# Patient Record
Sex: FEMALE | Race: ASIAN | Employment: FULL TIME | URBAN - METROPOLITAN AREA
[De-identification: names, ages, dates, MRNs, and addresses within clinical notes are randomized per-mention and may not be internally consistent; named-entity substitution may affect disease eponyms.]

---

## 2017-11-03 ENCOUNTER — OFFICE VISIT (OUTPATIENT)
Dept: INTERNAL MEDICINE CLINIC | Facility: CLINIC | Age: 27
End: 2017-11-03

## 2017-11-03 ENCOUNTER — TELEPHONE (OUTPATIENT)
Dept: INTERNAL MEDICINE CLINIC | Facility: CLINIC | Age: 27
End: 2017-11-03

## 2017-11-03 VITALS
RESPIRATION RATE: 12 BRPM | HEART RATE: 76 BPM | BODY MASS INDEX: 20.99 KG/M2 | TEMPERATURE: 98 F | SYSTOLIC BLOOD PRESSURE: 104 MMHG | HEIGHT: 58 IN | WEIGHT: 100 LBS | DIASTOLIC BLOOD PRESSURE: 70 MMHG

## 2017-11-03 DIAGNOSIS — Z01.419 WELL FEMALE EXAM WITH ROUTINE GYNECOLOGICAL EXAM: ICD-10-CM

## 2017-11-03 DIAGNOSIS — Z00.00 ANNUAL PHYSICAL EXAM: Primary | ICD-10-CM

## 2017-11-03 DIAGNOSIS — R21 RASH: ICD-10-CM

## 2017-11-03 DIAGNOSIS — F41.1 GAD (GENERALIZED ANXIETY DISORDER): ICD-10-CM

## 2017-11-03 PROCEDURE — 99395 PREV VISIT EST AGE 18-39: CPT | Performed by: INTERNAL MEDICINE

## 2017-11-03 RX ORDER — CLOTRIMAZOLE AND BETAMETHASONE DIPROPIONATE 10; .64 MG/G; MG/G
1 CREAM TOPICAL 2 TIMES DAILY
Qty: 30 G | Refills: 0 | Status: SHIPPED | OUTPATIENT
Start: 2017-11-03 | End: 2020-07-30

## 2017-11-03 RX ORDER — ALPRAZOLAM 0.25 MG/1
0.25 TABLET ORAL NIGHTLY PRN
Qty: 30 TABLET | Refills: 0 | Status: SHIPPED | OUTPATIENT
Start: 2017-11-03 | End: 2018-04-14

## 2017-11-03 NOTE — TELEPHONE ENCOUNTER
Pt contacted (Name and  verified) and Md message below relayed to pt. Pharmacy information obtained from pt and Rx sent as written below.     Pt verbalizes understanding, expresses intent to comply, denies any further questions or concerns and agrees

## 2017-11-03 NOTE — TELEPHONE ENCOUNTER
Pls call pt; I forgot to give her cream for her rash on foot; we can give her generic lotrisone cream applied bid for one week. 30gm   Call back if persist. Stop using neosporin on the rash.

## 2017-11-03 NOTE — PROGRESS NOTES
HPI:    Patient ID: Danisha Officer is a 32year old female. Patient presents today for annual physical.       Anxiety   This is a chronic problem. The current episode started more than 1 year ago. The problem occurs intermittently.  The problem has present. Cardiovascular: Normal rate, regular rhythm and normal heart sounds. No murmur heard. Pulmonary/Chest: Effort normal and breath sounds normal. No respiratory distress. She has no wheezes. She has no rales. Abdominal: Soft.  Bowel sounds are

## 2018-04-14 ENCOUNTER — TELEPHONE (OUTPATIENT)
Dept: INTERNAL MEDICINE CLINIC | Facility: CLINIC | Age: 28
End: 2018-04-14

## 2018-04-16 RX ORDER — ALPRAZOLAM 0.25 MG/1
TABLET ORAL
Qty: 30 TABLET | Refills: 0 | OUTPATIENT
Start: 2018-04-16 | End: 2018-08-19

## 2018-08-20 RX ORDER — ALPRAZOLAM 0.25 MG/1
TABLET ORAL
Qty: 30 TABLET | Refills: 0 | Status: SHIPPED
Start: 2018-08-20 | End: 2019-06-28 | Stop reason: ALTCHOICE

## 2018-08-20 NOTE — TELEPHONE ENCOUNTER
Phone call to [facility], for Alprazolam o.25 mg tab, qty # 30 with no additional refills.

## 2018-08-20 NOTE — TELEPHONE ENCOUNTER
LOV: 11-3-17 Last Rx: 4-16-18     Please advise in regards to refill request. Thank You    Please respond to pool: REKHA DON LPN/JEAN CARLOS

## 2019-06-28 ENCOUNTER — OFFICE VISIT (OUTPATIENT)
Dept: INTERNAL MEDICINE CLINIC | Facility: CLINIC | Age: 29
End: 2019-06-28
Payer: COMMERCIAL

## 2019-06-28 VITALS
TEMPERATURE: 99 F | DIASTOLIC BLOOD PRESSURE: 67 MMHG | SYSTOLIC BLOOD PRESSURE: 103 MMHG | HEART RATE: 60 BPM | HEIGHT: 58 IN | RESPIRATION RATE: 18 BRPM | WEIGHT: 99 LBS | BODY MASS INDEX: 20.78 KG/M2

## 2019-06-28 DIAGNOSIS — Z01.419 WELL FEMALE EXAM WITH ROUTINE GYNECOLOGICAL EXAM: ICD-10-CM

## 2019-06-28 DIAGNOSIS — Z00.00 ANNUAL PHYSICAL EXAM: Primary | ICD-10-CM

## 2019-06-28 PROCEDURE — 99395 PREV VISIT EST AGE 18-39: CPT | Performed by: INTERNAL MEDICINE

## 2019-06-28 NOTE — PROGRESS NOTES
HPI:    Patient ID: Danisha Officer is a 25year old female. Patient presents today for her school physical/annual physical otherwise has  No specific complaint. She is otherwise had been healthy with pmhx signficant for mild hyperlipidemia before. has no wheezes. She has no rales. Abdominal: Soft. Bowel sounds are normal. She exhibits no distension and no mass. There is no tenderness. There is no rebound and no guarding. Musculoskeletal: Normal range of motion.  She exhibits no edema or tendernes

## 2019-06-29 PROBLEM — Z00.00 ANNUAL PHYSICAL EXAM: Status: ACTIVE | Noted: 2019-06-29

## 2019-06-29 PROBLEM — Z01.419 WELL FEMALE EXAM WITH ROUTINE GYNECOLOGICAL EXAM: Status: ACTIVE | Noted: 2019-06-29

## 2019-07-11 ENCOUNTER — TELEPHONE (OUTPATIENT)
Dept: INTERNAL MEDICINE CLINIC | Facility: CLINIC | Age: 29
End: 2019-07-11

## 2019-07-11 DIAGNOSIS — Z00.00 ANNUAL PHYSICAL EXAM: Primary | ICD-10-CM

## 2019-07-11 DIAGNOSIS — Z11.9 SCREENING EXAMINATION FOR INFECTIOUS DISEASE: ICD-10-CM

## 2019-07-11 NOTE — TELEPHONE ENCOUNTER
Fax  Received from Patient  She would like Student Health record form completed and signed by MD. Please call pt when ready for . Dr. Robin Head form placed on your desk for review/ completion LOV 6/28/19.

## 2019-07-16 NOTE — TELEPHONE ENCOUNTER
Called pt she asked if I can faxed orders to her at [phone] she is next to fax now. (ordered 7/16, 7/15,6/28)Orders faxed.

## 2019-07-16 NOTE — TELEPHONE ENCOUNTER
Pt called back informed of Dr. Antwon Lan note below and she voiced understanding. Dr. Antwon Lan patient would like to know if you can add MMR, Varicella & Hep B titers. Stts she needs it for school.  She would like to go to [employer]. Please call after 4pm since she will be at

## 2019-07-23 LAB
ABSOLUTE BASOPHILS: 62 CELLS/UL (ref 0–200)
ABSOLUTE EOSINOPHILS: 157 CELLS/UL (ref 15–500)
ABSOLUTE LYMPHOCYTES: 1422 CELLS/UL (ref 850–3900)
ABSOLUTE MONOCYTES: 605 CELLS/UL (ref 200–950)
ABSOLUTE NEUTROPHILS: 3354 CELLS/UL (ref 1500–7800)
ALBUMIN/GLOBULIN RATIO: 1.3 (CALC) (ref 1–2.5)
ALBUMIN: 4 G/DL (ref 3.6–5.1)
ALKALINE PHOSPHATASE: 42 U/L (ref 33–115)
ALT: 13 U/L (ref 6–29)
APPEARANCE: CLEAR
AST: 17 U/L (ref 10–30)
BASOPHILS: 1.1 %
BILIRUBIN, TOTAL: 0.4 MG/DL (ref 0.2–1.2)
BILIRUBIN: NEGATIVE
BUN: 12 MG/DL (ref 7–25)
CALCIUM: 9.1 MG/DL (ref 8.6–10.2)
CARBON DIOXIDE: 23 MMOL/L (ref 20–32)
CHLORIDE: 108 MMOL/L (ref 98–110)
CHOL/HDLC RATIO: 3.1 (CALC)
CHOLESTEROL, TOTAL: 185 MG/DL
COLOR: YELLOW
CREATININE: 0.66 MG/DL (ref 0.5–1.1)
EGFR IF AFRICN AM: 137 ML/MIN/1.73M2
EGFR IF NONAFRICN AM: 119 ML/MIN/1.73M2
EOSINOPHILS: 2.8 %
GLOBULIN: 3.1 G/DL (CALC) (ref 1.9–3.7)
GLUCOSE: 88 MG/DL (ref 65–99)
GLUCOSE: NEGATIVE
HDL CHOLESTEROL: 60 MG/DL
HEMATOCRIT: 40.6 % (ref 35–45)
HEMOGLOBIN: 13.1 G/DL (ref 11.7–15.5)
HEPATITIS B SURFACE$ANTIBODY QL: REACTIVE
LDL-CHOLESTEROL: 110 MG/DL (CALC)
LEUKOCYTE ESTERASE: NEGATIVE
LYMPHOCYTES: 25.4 %
MCH: 28.1 PG (ref 27–33)
MCHC: 32.3 G/DL (ref 32–36)
MCV: 86.9 FL (ref 80–100)
MEASLES ANTIBODY (IGG): 114 AU/ML
MONOCYTES: 10.8 %
MPV: 9.7 FL (ref 7.5–12.5)
MUMPS VIRUS$ANTIBODY (IGG): 62.5 AU/ML
NEUTROPHILS: 59.9 %
NITRITE: NEGATIVE
NON-HDL CHOLESTEROL: 125 MG/DL (CALC)
OCCULT BLOOD: NEGATIVE
PH: 6 (ref 5–8)
PLATELET COUNT: 367 THOUSAND/UL (ref 140–400)
POTASSIUM: 4.3 MMOL/L (ref 3.5–5.3)
PROTEIN, TOTAL: 7.1 G/DL (ref 6.1–8.1)
PROTEIN: NEGATIVE
RDW: 12.7 % (ref 11–15)
RED BLOOD CELL COUNT: 4.67 MILLION/UL (ref 3.8–5.1)
RUBELLA ANTIBODY (IGG): 2.66 INDEX
SODIUM: 139 MMOL/L (ref 135–146)
SPECIFIC GRAVITY: 1.03 (ref 1–1.03)
TRIGLYCERIDES: 64 MG/DL
VARICELLA ZOSTER VIRUS$AB (IGG): 783.6 INDEX
WHITE BLOOD CELL COUNT: 5.6 THOUSAND/UL (ref 3.8–10.8)

## 2019-07-27 ENCOUNTER — TELEPHONE (OUTPATIENT)
Dept: INTERNAL MEDICINE CLINIC | Facility: CLINIC | Age: 29
End: 2019-07-27

## 2019-07-27 NOTE — TELEPHONE ENCOUNTER
LMTCB. Office number given with instructions to call back Monday after 8 am.Forms at [employer] work station.

## 2019-07-30 ENCOUNTER — TELEPHONE (OUTPATIENT)
Dept: INTERNAL MEDICINE CLINIC | Facility: CLINIC | Age: 29
End: 2019-07-30

## 2019-07-30 NOTE — TELEPHONE ENCOUNTER
Pt is requesting a Vaccination shot will like to scheduled for this Friday if possible so she can  the forms at the same time because she live a hour away     Please advise

## 2019-07-31 NOTE — TELEPHONE ENCOUNTER
Pt advised per lab results she is immune and does not need vaccines. Pt is asking if labs, and PE form can be faxed to her. Informed pt PE form not scanned into chart and will reach out to clinic site to request they fax the form. Pt will call back with fax number. Clinic site - can you check the office for pt physical form that was filled out by KIERAN

## 2019-07-31 NOTE — TELEPHONE ENCOUNTER
S/W patient who states she is not near the fax machine right now. Unable to fax information to her. Patient called back with secured fax number. Physical Exam for and lab results faxed. Patient will call back to confirm recipt.

## 2020-07-30 ENCOUNTER — OFFICE VISIT (OUTPATIENT)
Dept: INTERNAL MEDICINE CLINIC | Facility: CLINIC | Age: 30
End: 2020-07-30
Payer: COMMERCIAL

## 2020-07-30 VITALS
OXYGEN SATURATION: 100 % | HEIGHT: 58 IN | TEMPERATURE: 98 F | WEIGHT: 102 LBS | DIASTOLIC BLOOD PRESSURE: 76 MMHG | HEART RATE: 91 BPM | RESPIRATION RATE: 20 BRPM | SYSTOLIC BLOOD PRESSURE: 104 MMHG | BODY MASS INDEX: 21.41 KG/M2

## 2020-07-30 DIAGNOSIS — R00.2 PALPITATIONS: ICD-10-CM

## 2020-07-30 DIAGNOSIS — Z00.00 ANNUAL PHYSICAL EXAM: Primary | ICD-10-CM

## 2020-07-30 DIAGNOSIS — Z01.812 ENCOUNTER FOR PREPROCEDURE SCREENING LABORATORY TESTING FOR COVID-19: ICD-10-CM

## 2020-07-30 DIAGNOSIS — Z20.822 ENCOUNTER FOR PREPROCEDURE SCREENING LABORATORY TESTING FOR COVID-19: ICD-10-CM

## 2020-07-30 DIAGNOSIS — R07.89 CHEST TIGHTNESS: ICD-10-CM

## 2020-07-30 PROCEDURE — 99395 PREV VISIT EST AGE 18-39: CPT | Performed by: INTERNAL MEDICINE

## 2020-07-30 PROCEDURE — 3008F BODY MASS INDEX DOCD: CPT | Performed by: INTERNAL MEDICINE

## 2020-07-30 PROCEDURE — 3078F DIAST BP <80 MM HG: CPT | Performed by: INTERNAL MEDICINE

## 2020-07-30 PROCEDURE — 3074F SYST BP LT 130 MM HG: CPT | Performed by: INTERNAL MEDICINE

## 2020-07-30 PROCEDURE — 93000 ELECTROCARDIOGRAM COMPLETE: CPT | Performed by: INTERNAL MEDICINE

## 2020-07-30 RX ORDER — CLOTRIMAZOLE AND BETAMETHASONE DIPROPIONATE 10; .64 MG/G; MG/G
1 CREAM TOPICAL 2 TIMES DAILY
Qty: 30 G | Refills: 1 | Status: SHIPPED | OUTPATIENT
Start: 2020-07-30 | End: 2021-01-19

## 2020-07-30 RX ORDER — ALPRAZOLAM 0.25 MG/1
0.25 TABLET ORAL NIGHTLY PRN
Qty: 30 TABLET | Refills: 0 | Status: SHIPPED | OUTPATIENT
Start: 2020-07-30 | End: 2021-01-19

## 2020-07-30 NOTE — PROGRESS NOTES
HPI:    Patient ID: Fuad Wade is a 30year old female. Patient presents today for annual physical/school physical.     Chest Pain    This is a chronic problem. The current episode started more than 1 year ago (6 years intermittently).  The onse Normocephalic and atraumatic. Right Ear: External ear normal.   Left Ear: External ear normal.   Nose: Nose normal.   Mouth/Throat: Oropharynx is clear and moist. No oropharyngeal exudate. Eyes: Pupils are equal, round, and reactive to light.  Conjuncti axis/interval, no ST T wave changes and was normal. I told pt to do 2decho and also do treadmill GXT.     (R00.2) Palpitations  Plan: CARD ECHO 2D DOPPLER (CPT=93154), CARD         TREADMILL STRESS, ADULT (JRL=00898)        See above.     (Z11.59) Encounte

## 2020-07-31 LAB
ABSOLUTE BASOPHILS: 71 CELLS/UL (ref 0–200)
ABSOLUTE EOSINOPHILS: 82 CELLS/UL (ref 15–500)
ABSOLUTE LYMPHOCYTES: 1040 CELLS/UL (ref 850–3900)
ABSOLUTE MONOCYTES: 576 CELLS/UL (ref 200–950)
ABSOLUTE NEUTROPHILS: 3330 CELLS/UL (ref 1500–7800)
ALBUMIN/GLOBULIN RATIO: 1.3 (CALC) (ref 1–2.5)
ALBUMIN: 4.1 G/DL (ref 3.6–5.1)
ALKALINE PHOSPHATASE: 49 U/L (ref 31–125)
ALT: 11 U/L (ref 6–29)
APPEARANCE: CLEAR
AST: 16 U/L (ref 10–30)
BASOPHILS: 1.4 %
BILIRUBIN, TOTAL: 0.3 MG/DL (ref 0.2–1.2)
BILIRUBIN: NEGATIVE
BUN: 10 MG/DL (ref 7–25)
CALCIUM: 9.1 MG/DL (ref 8.6–10.2)
CARBON DIOXIDE: 24 MMOL/L (ref 20–32)
CHLORIDE: 105 MMOL/L (ref 98–110)
CHOL/HDLC RATIO: 2.9 (CALC)
CHOLESTEROL, TOTAL: 192 MG/DL
COLOR: YELLOW
CREATININE: 0.66 MG/DL (ref 0.5–1.1)
EGFR IF AFRICN AM: 136 ML/MIN/1.73M2
EGFR IF NONAFRICN AM: 118 ML/MIN/1.73M2
EOSINOPHILS: 1.6 %
GLOBULIN: 3.1 G/DL (CALC) (ref 1.9–3.7)
GLUCOSE: 83 MG/DL (ref 65–99)
GLUCOSE: NEGATIVE
HDL CHOLESTEROL: 66 MG/DL
HEMATOCRIT: 39.6 % (ref 35–45)
HEMOGLOBIN: 13.2 G/DL (ref 11.7–15.5)
KETONES: NEGATIVE
LDL-CHOLESTEROL: 112 MG/DL (CALC)
LEUKOCYTE ESTERASE: NEGATIVE
LYMPHOCYTES: 20.4 %
MCH: 29 PG (ref 27–33)
MCHC: 33.3 G/DL (ref 32–36)
MCV: 87 FL (ref 80–100)
MONOCYTES: 11.3 %
MPV: 9.5 FL (ref 7.5–12.5)
NEUTROPHILS: 65.3 %
NITRITE: NEGATIVE
NON-HDL CHOLESTEROL: 126 MG/DL (CALC)
OCCULT BLOOD: NEGATIVE
PH: 5.5 (ref 5–8)
PLATELET COUNT: 354 THOUSAND/UL (ref 140–400)
POTASSIUM: 3.9 MMOL/L (ref 3.5–5.3)
PROTEIN, TOTAL: 7.2 G/DL (ref 6.1–8.1)
PROTEIN: NEGATIVE
RDW: 12.6 % (ref 11–15)
RED BLOOD CELL COUNT: 4.55 MILLION/UL (ref 3.8–5.1)
SODIUM: 136 MMOL/L (ref 135–146)
SPECIFIC GRAVITY: 1.02 (ref 1–1.03)
TRIGLYCERIDES: 53 MG/DL
TSH W/REFLEX TO FT4: 1.36 MIU/L
WHITE BLOOD CELL COUNT: 5.1 THOUSAND/UL (ref 3.8–10.8)

## 2020-08-03 NOTE — TELEPHONE ENCOUNTER
S/W patient and advised school physical forma re ready to p/u. Patient states she will p/uc Thursday. Forms at this time are at [employer] work station.

## 2020-09-11 ENCOUNTER — HOSPITAL ENCOUNTER (OUTPATIENT)
Dept: CV DIAGNOSTICS | Facility: HOSPITAL | Age: 30
Discharge: HOME | End: 2020-09-11
Attending: INTERNAL MEDICINE
Payer: COMMERCIAL

## 2020-09-11 DIAGNOSIS — R00.2 PALPITATIONS: ICD-10-CM

## 2020-09-11 DIAGNOSIS — R07.89 CHEST TIGHTNESS: ICD-10-CM

## 2020-09-11 PROCEDURE — 93306 TTE W/DOPPLER COMPLETE: CPT | Performed by: INTERNAL MEDICINE

## 2020-09-14 ENCOUNTER — TELEPHONE (OUTPATIENT)
Dept: INTERNAL MEDICINE CLINIC | Facility: CLINIC | Age: 30
End: 2020-09-14

## 2020-09-14 NOTE — TELEPHONE ENCOUNTER
PT wanted  to be aware , she's not going for Covid test tomorrow prior to Stress treadmill, she found out she's going to be charged $1800.00 for the 2d echo and she can't afford it , only works 2 days a week, asked have she received a bill, stated no , a

## 2020-09-16 ENCOUNTER — LAB ENCOUNTER (OUTPATIENT)
Dept: LAB | Facility: HOSPITAL | Age: 30
End: 2020-09-16
Attending: INTERNAL MEDICINE
Payer: COMMERCIAL

## 2020-09-16 DIAGNOSIS — Z01.812 PRE-PROCEDURE LAB EXAM: Primary | ICD-10-CM

## 2021-01-19 ENCOUNTER — APPOINTMENT (OUTPATIENT)
Dept: CT IMAGING | Facility: HOSPITAL | Age: 31
End: 2021-01-19
Attending: EMERGENCY MEDICINE
Payer: COMMERCIAL

## 2021-01-19 ENCOUNTER — OFFICE VISIT (OUTPATIENT)
Dept: INTERNAL MEDICINE CLINIC | Facility: CLINIC | Age: 31
End: 2021-01-19
Payer: COMMERCIAL

## 2021-01-19 ENCOUNTER — HOSPITAL ENCOUNTER (EMERGENCY)
Facility: HOSPITAL | Age: 31
Discharge: HOME | End: 2021-01-19
Attending: EMERGENCY MEDICINE
Payer: COMMERCIAL

## 2021-01-19 VITALS
TEMPERATURE: 99 F | BODY MASS INDEX: 20.42 KG/M2 | SYSTOLIC BLOOD PRESSURE: 102 MMHG | WEIGHT: 104 LBS | DIASTOLIC BLOOD PRESSURE: 66 MMHG | RESPIRATION RATE: 12 BRPM | HEIGHT: 60 IN | HEART RATE: 67 BPM

## 2021-01-19 VITALS
SYSTOLIC BLOOD PRESSURE: 134 MMHG | HEART RATE: 96 BPM | BODY MASS INDEX: 20 KG/M2 | OXYGEN SATURATION: 100 % | DIASTOLIC BLOOD PRESSURE: 82 MMHG | TEMPERATURE: 98 F | WEIGHT: 103 LBS | RESPIRATION RATE: 16 BRPM

## 2021-01-19 DIAGNOSIS — S06.0X0A CONCUSSION WITHOUT LOSS OF CONSCIOUSNESS, INITIAL ENCOUNTER: Primary | ICD-10-CM

## 2021-01-19 DIAGNOSIS — G44.319 ACUTE POST-TRAUMATIC HEADACHE, NOT INTRACTABLE: ICD-10-CM
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
## 2021-01-19 DIAGNOSIS — S16.1XXA STRAIN OF NECK MUSCLE, INITIAL ENCOUNTER: ICD-10-CM

## 2021-01-19 DIAGNOSIS — S06.0X0A CONCUSSION WITHOUT LOSS OF CONSCIOUSNESS, INITIAL ENCOUNTER: ICD-10-CM

## 2021-01-19 PROCEDURE — 99214 OFFICE O/P EST MOD 30 MIN: CPT | Performed by: INTERNAL MEDICINE

## 2021-01-19 PROCEDURE — 70450 CT HEAD/BRAIN W/O DYE: CPT | Performed by: EMERGENCY MEDICINE

## 2021-01-19 PROCEDURE — 99284 EMERGENCY DEPT VISIT MOD MDM: CPT | Performed by: EMERGENCY MEDICINE

## 2021-01-19 PROCEDURE — 3074F SYST BP LT 130 MM HG: CPT | Performed by: INTERNAL MEDICINE

## 2021-01-19 PROCEDURE — 3078F DIAST BP <80 MM HG: CPT | Performed by: INTERNAL MEDICINE

## 2021-01-19 PROCEDURE — 3008F BODY MASS INDEX DOCD: CPT | Performed by: INTERNAL MEDICINE

## 2021-01-19 RX ORDER — CYCLOBENZAPRINE HCL 10 MG
10 TABLET ORAL 3 TIMES DAILY PRN
Qty: 20 TABLET | Refills: 0 | Status: SHIPPED | OUTPATIENT
Start: 2021-01-19 | End: 2021-11-16 | Stop reason: ALTCHOICE

## 2021-01-19 NOTE — PROGRESS NOTES
HPI:    Patient ID: Dario Ram is a 26year old female. Headache   This is a new problem. The current episode started today. The problem occurs constantly. The problem has been waxing and waning. The pain is located in the occipital region.  The Dispense Refill   • Calcium Carbonate-Vitamin D (CALTRATE 600+D OR) Take 1 tablet by mouth daily. • clotrimazole-betamethasone (LOTRISONE) 1-0.05 % External Cream Apply 1 Application topically 2 (two) times daily. Apply twice daily to foot for 1 week. thyromegaly present. Cardiovascular: Normal rate, regular rhythm, normal heart sounds and intact distal pulses. No murmur heard. Pulmonary/Chest: Effort normal and breath sounds normal. No respiratory distress. She has no wheezes. She has no rales.
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
## 2021-02-04 ENCOUNTER — TELEMEDICINE (OUTPATIENT)
Dept: INTERNAL MEDICINE CLINIC | Facility: CLINIC | Age: 31
End: 2021-02-04

## 2021-02-04 DIAGNOSIS — M54.6 ACUTE THORACIC BACK PAIN, UNSPECIFIED BACK PAIN LATERALITY: ICD-10-CM

## 2021-02-04 DIAGNOSIS — S06.0X0D CONCUSSION WITHOUT LOSS OF CONSCIOUSNESS, SUBSEQUENT ENCOUNTER: Primary | ICD-10-CM

## 2021-02-04 PROCEDURE — 99213 OFFICE O/P EST LOW 20 MIN: CPT | Performed by: INTERNAL MEDICINE

## 2021-02-05 ENCOUNTER — TELEPHONE (OUTPATIENT)
Dept: INTERNAL MEDICINE CLINIC | Facility: CLINIC | Age: 31
End: 2021-02-05
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
## 2021-02-05 NOTE — TELEPHONE ENCOUNTER
Patient is calling with the fax [phone] Attention Thea Atkinson. Patient is also requesting to have a copy of the documents to her mychart.

## 2021-02-05 NOTE — PROGRESS NOTES
HPI:    Patient ID: Hiram Rodgers is a 26year old female. .This is a telemedicine visit with live, interactive video and audio.       Patient understands and accepts financial responsibility for any deductible, co-insurance and/or co-pays associat (Other) Father         ESRD    • Cancer Maternal Grandfather         lung   • Thyroid disease Mother    • No Known Problems Sister    • No Known Problems Brother       Social History: Social History    Tobacco Use      Smoking status: Never Smoker      Smo

## 2021-02-05 NOTE — TELEPHONE ENCOUNTER
S/W patient to obtain fax number for [facility] so  Completed Health Clearance For Medical Absence form can be  Sent to them. Patient states she will call the school now to obtain the fax number.

## 2021-02-05 NOTE — TELEPHONE ENCOUNTER
When I had my conversation with her, she told me she is doing much better and was feeling good enoough to start her clinicals so my note yesterday says so.    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

## 2021-02-06 NOTE — TELEPHONE ENCOUNTER
S/W patient and advised school excuse letter was faxed to the number she gave us and fax confirmation received. Per patient's question, advised she will be able to access a copy of the letter through her [employer] account.

## 2021-02-12 ENCOUNTER — OFFICE VISIT (OUTPATIENT)
Dept: NEUROLOGY | Facility: CLINIC | Age: 31
End: 2021-02-12
Payer: COMMERCIAL

## 2021-02-12 VITALS
BODY MASS INDEX: 20 KG/M2 | HEART RATE: 88 BPM | WEIGHT: 101 LBS | DIASTOLIC BLOOD PRESSURE: 78 MMHG | SYSTOLIC BLOOD PRESSURE: 128 MMHG | RESPIRATION RATE: 14 BRPM

## 2021-02-12 DIAGNOSIS — S06.0X0A CONCUSSION WITHOUT LOSS OF CONSCIOUSNESS, INITIAL ENCOUNTER: Primary | ICD-10-CM

## 2021-02-12 DIAGNOSIS — M25.561 RIGHT KNEE PAIN, UNSPECIFIED CHRONICITY: ICD-10-CM

## 2021-02-12 PROCEDURE — 99204 OFFICE O/P NEW MOD 45 MIN: CPT | Performed by: OTHER

## 2021-02-12 PROCEDURE — 3074F SYST BP LT 130 MM HG: CPT | Performed by: OTHER

## 2021-02-12 PROCEDURE — 3078F DIAST BP <80 MM HG: CPT | Performed by: OTHER

## 2021-02-12 RX ORDER — MELOXICAM 15 MG/1
15 TABLET ORAL DAILY
COMMUNITY
End: 2021-11-16 | Stop reason: ALTCHOICE

## 2021-02-12 RX ORDER — ALPRAZOLAM 0.25 MG/1
0.25 TABLET ORAL NIGHTLY PRN
COMMUNITY
End: 2021-03-27

## 2021-02-12 RX ORDER — NORTRIPTYLINE HYDROCHLORIDE 25 MG/1
25 CAPSULE ORAL NIGHTLY
Qty: 30 CAPSULE | Refills: 0 | Status: SHIPPED | OUTPATIENT
Start: 2021-02-12 | End: 2021-03-29

## 2021-02-12 RX ORDER — ACETAMINOPHEN 500 MG
500 TABLET ORAL 2 TIMES DAILY PRN
COMMUNITY

## 2021-02-12 NOTE — H&P
Neurology H&P    Sulaiman Jeter Patient Status:  No patient class for patient encounter    1990 MRN [id number]   Location [address] [facility], [address] [place] [address] Drive, [address] [facility] Attending No att. providers found   Hosp Day # 0 PCP Nathan Mraino sleeps only 3-4 hours of sleep. She denies any dizziness. She is very anxious about finishing school. Current Medications:  Current Outpatient Medications   Medication Sig Dispense Refill   • Meloxicam 15 MG Oral Tab Take 15 mg by mouth daily.      • ac sounds  Skin: normal, dry  Extremities: No clubbing or cyanosis      Neurologic:   MENTAL STATUS: alert, ox3, normal attention, language and fund of knowledge.       CRANIAL NERVES II to XII: PERRLA, no ptosis or diplopia, EOM intact, facial sensation intac knee  - Take frequent breaks form computer screens and wear blue light blocking glasses  - If all symptoms are due to post concussive syndrome they may be present for several weeks to several months.  It is impossible to say when exactly these symptoms will
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
## 2021-03-26 DIAGNOSIS — S06.0X0A CONCUSSION WITHOUT LOSS OF CONSCIOUSNESS, INITIAL ENCOUNTER: Primary | ICD-10-CM

## 2021-03-27 RX ORDER — ALPRAZOLAM 0.25 MG/1
0.25 TABLET ORAL NIGHTLY PRN
Qty: 30 TABLET | Refills: 0 | Status: SHIPPED | OUTPATIENT
Start: 2021-03-27 | End: 2021-11-16

## 2021-03-29 RX ORDER — NORTRIPTYLINE HYDROCHLORIDE 25 MG/1
25 CAPSULE ORAL NIGHTLY
Qty: 30 CAPSULE | Refills: 0 | Status: SHIPPED | OUTPATIENT
Start: 2021-03-29 | End: 2021-11-16 | Stop reason: ALTCHOICE

## 2021-03-29 NOTE — TELEPHONE ENCOUNTER
Nayely't scheduled on 4/7.     Medication: Nortriptyline    Date of last refill: 2/12/21 (#30/0)  [place]     Last office visit: 2/12/2021  Due back to clinic per last office note:  6 weeks  Date next office visit scheduled:

## 2021-04-07 ENCOUNTER — OFFICE VISIT (OUTPATIENT)
Dept: NEUROLOGY | Facility: CLINIC | Age: 31
End: 2021-04-07
Payer: COMMERCIAL

## 2021-04-07 VITALS
SYSTOLIC BLOOD PRESSURE: 128 MMHG | RESPIRATION RATE: 16 BRPM | DIASTOLIC BLOOD PRESSURE: 74 MMHG | BODY MASS INDEX: 20 KG/M2 | HEART RATE: 72 BPM | WEIGHT: 101 LBS

## 2021-04-07 DIAGNOSIS — S06.0X0D CONCUSSION WITHOUT LOSS OF CONSCIOUSNESS, SUBSEQUENT ENCOUNTER: Primary | ICD-10-CM

## 2021-04-07 PROCEDURE — 3074F SYST BP LT 130 MM HG: CPT | Performed by: OTHER

## 2021-04-07 PROCEDURE — 3078F DIAST BP <80 MM HG: CPT | Performed by: OTHER

## 2021-04-07 PROCEDURE — 99213 OFFICE O/P EST LOW 20 MIN: CPT | Performed by: OTHER
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
## 2021-04-07 NOTE — PROGRESS NOTES
Denies recent headaches. Patient is able to focus more. Patient states decrease in ringing of the ears.
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
## 2021-10-25 NOTE — TELEPHONE ENCOUNTER
To reception staff, pls call pt for appt per MD advised. Also mychart message with MD recommendation sent to pt.     TY      No future appointments.

## 2021-10-27 NOTE — TELEPHONE ENCOUNTER
Spoke to Patient. She will Schedule an Appointment via [address], She was at work when I called.

## 2021-10-28 RX ORDER — ALPRAZOLAM 0.25 MG/1
0.25 TABLET ORAL NIGHTLY PRN
Qty: 30 TABLET | Refills: 0 | OUTPATIENT
Start: 2021-10-28

## 2021-11-16 ENCOUNTER — OFFICE VISIT (OUTPATIENT)
Dept: INTERNAL MEDICINE CLINIC | Facility: CLINIC | Age: 31
End: 2021-11-16
Payer: COMMERCIAL

## 2021-11-16 VITALS
SYSTOLIC BLOOD PRESSURE: 98 MMHG | WEIGHT: 104.88 LBS | BODY MASS INDEX: 21.14 KG/M2 | OXYGEN SATURATION: 98 % | HEIGHT: 59 IN | HEART RATE: 75 BPM | DIASTOLIC BLOOD PRESSURE: 63 MMHG | TEMPERATURE: 98 F

## 2021-11-16 DIAGNOSIS — F41.1 GAD (GENERALIZED ANXIETY DISORDER): ICD-10-CM

## 2021-11-16 DIAGNOSIS — Z01.419 WELL FEMALE EXAM WITH ROUTINE GYNECOLOGICAL EXAM: ICD-10-CM

## 2021-11-16 DIAGNOSIS — Z00.00 ANNUAL PHYSICAL EXAM: Primary | ICD-10-CM

## 2021-11-16 PROCEDURE — 3008F BODY MASS INDEX DOCD: CPT | Performed by: INTERNAL MEDICINE

## 2021-11-16 PROCEDURE — 99395 PREV VISIT EST AGE 18-39: CPT | Performed by: INTERNAL MEDICINE

## 2021-11-16 PROCEDURE — 3074F SYST BP LT 130 MM HG: CPT | Performed by: INTERNAL MEDICINE

## 2021-11-16 PROCEDURE — 3078F DIAST BP <80 MM HG: CPT | Performed by: INTERNAL MEDICINE

## 2021-11-16 RX ORDER — ALPRAZOLAM 0.25 MG/1
0.25 TABLET ORAL NIGHTLY PRN
Qty: 30 TABLET | Refills: 0 | Status: SHIPPED | OUTPATIENT
Start: 2021-11-16

## 2021-11-16 NOTE — PROGRESS NOTES
Subjective:     Patient ID: Vaishali Granados is a 33year old female. Patient presents today for her annual physical and ffup for her anxiety; History/Other:   Review of Systems   Constitutional: Negative. HENT: Negative. Eyes: Negative. atraumatic. Right Ear: Tympanic membrane, ear canal and external ear normal.      Left Ear: Tympanic membrane and external ear normal.      Nose: Nose normal.      Mouth/Throat:      Pharynx: No oropharyngeal exudate.    Eyes:      General:         Rig .    (Z01.419) Well female exam with routine gynecological exam  Plan: OBG - INTERNAL        Pt had never had papsmear done before and advised pt she needs to have it done; referred to gyne.          No orders of the defined types were placed in this encoun

## 2021-12-12 ENCOUNTER — PATIENT MESSAGE (OUTPATIENT)
Dept: INTERNAL MEDICINE CLINIC | Facility: CLINIC | Age: 31
End: 2021-12-12

## 2021-12-12 NOTE — TELEPHONE ENCOUNTER
From: Mariola Hilliard  To: Juan Tse MD  Sent: 12/12/2021 12:05 PM CST  Subject: Question regarding LIPID PANEL    Happy Sunday Doc,   This is non urgent and only for update. I made the phlebotomist aware that it is a non fasting blood draw.

## 2021-12-26 ENCOUNTER — TELEPHONE (OUTPATIENT)
Dept: INTERNAL MEDICINE CLINIC | Facility: CLINIC | Age: 31
End: 2021-12-26

## 2021-12-26 DIAGNOSIS — T14.8XXA WOUND INFECTION: Primary | ICD-10-CM

## 2021-12-26 DIAGNOSIS — L08.9 WOUND INFECTION: Primary | ICD-10-CM

## 2021-12-26 PROCEDURE — 99213 OFFICE O/P EST LOW 20 MIN: CPT | Performed by: INTERNAL MEDICINE

## 2021-12-26 RX ORDER — CEPHALEXIN 500 MG/1
500 CAPSULE ORAL 3 TIMES DAILY
Qty: 21 CAPSULE | Refills: 0 | Status: SHIPPED | OUTPATIENT
Start: 2021-12-26 | End: 2022-01-02

## 2021-12-26 NOTE — TELEPHONE ENCOUNTER
Patient ID: Danisha Officer is a 33year old female. Patient presents with:  Cellulitis       Virtual/Telephone Check-In    Danisha Laresr verbally consents to a Virtual/Telephone Check-In service on 12/26/21.  Patient understands and accepts Enrique Jamie Substance and Sexual Activity      Alcohol use: Yes        Comment: rare      Drug use: No      Sexual activity: Yes        Partners: Male        Comment: boyfriend    Other Topics      Concerns:         Service: Not Asked        Blood Transfusions visitation. There are limitations of this visit as no physical exam could be performed. Every conscious effort was taken to allow for sufficient and adequate time.   This billing was spent on reviewing labs, medications, radiology tests and decision makin

## 2022-01-13 LAB — AMB EXT COVID-19 RESULT: DETECTED

## 2022-01-14 ENCOUNTER — TELEPHONE (OUTPATIENT)
Dept: INTERNAL MEDICINE CLINIC | Facility: CLINIC | Age: 32
End: 2022-01-14

## 2022-01-14 NOTE — TELEPHONE ENCOUNTER
----- Message -----   From: Diana Hughes   Sent: 1/14/2022   8:38 AM CST   To: Olga Hooker Customer Response Pool   Subject: Change of condition update                         Topic: <<Select One of the Topics Below>>.       Hi Doc,       Just for up

## 2022-01-14 NOTE — TELEPHONE ENCOUNTER
Called patient in regards to MyChart message below, had rapid that was Covid +  And took PCR today     Offered video visit , declines at this time   Has been working on a Covid unit since 2019,     Reviewed current CDC guidelines,  informed of  website of

## 2022-01-18 ENCOUNTER — NURSE TRIAGE (OUTPATIENT)
Dept: INTERNAL MEDICINE CLINIC | Facility: CLINIC | Age: 32
End: 2022-01-18

## 2022-01-18 ENCOUNTER — TELEMEDICINE (OUTPATIENT)
Dept: INTERNAL MEDICINE CLINIC | Facility: CLINIC | Age: 32
End: 2022-01-18

## 2022-01-18 DIAGNOSIS — U07.1 COVID-19: Primary | ICD-10-CM

## 2022-01-18 PROCEDURE — 99213 OFFICE O/P EST LOW 20 MIN: CPT | Performed by: NURSE PRACTITIONER

## 2022-01-18 RX ORDER — BENZONATATE 100 MG/1
100 CAPSULE ORAL 3 TIMES DAILY PRN
Qty: 30 CAPSULE | Refills: 0 | Status: SHIPPED | OUTPATIENT
Start: 2022-01-18

## 2022-01-18 NOTE — PROGRESS NOTES
Video Check-In    Caretha Castleman verbally consents to a Video Check-In visit on 01/18/22. Patient understands and accepts financial responsibility for any deductible, co-insurance and/or co-pays associated with this service.     Heath Resendiz is in Resolved  • Fatigue:   Yes [x]     No []     • Myalgia/chills   Yes []    No [x]       • Sore throat:    Yes []      No [x]      • Headache:     Yes [x]     No []      • Chest pain:     Yes [x]     No []   Resolved  • Nausea/Vomiting    Yes [x]     No [] + Emmy Espino    Please note that the following visit was completed using two-way, real-time interactive audio and/or video communication.   This has been done in good alin to provide continuity of care in the best interest of the provider-patient relationsh • Use pulse ox less than 92% go to ER.   • Deep breathing exercises  • Alternate sleeping patterns not always sleeping on back  • Start tessalon  CDC Update: Isolation & Quarantine Guidelines for Patients - Updated 12/29/2021    If You Test Positive for C understands phone evaluation is not a substitute for face-to-face examination or emergency care. Patient advised to go to ER or call 911 for worsening symptoms or acute distress.    CHRIS Garrido      Pt. aware to schedule follow up with OV and doing

## 2022-01-18 NOTE — TELEPHONE ENCOUNTER
Action Requested: Summary for Provider     []  Critical Lab, Recommendations Needed  [] Need Additional Advice  [x]   FYI    []   Need Orders  [] Need Medications Sent to Pharmacy  []  Other     SUMMARY: Patient called asking for note for work.  Patient rangel

## 2022-02-18 ENCOUNTER — NURSE TRIAGE (OUTPATIENT)
Dept: INTERNAL MEDICINE CLINIC | Facility: CLINIC | Age: 32
End: 2022-02-18

## 2022-03-08 ENCOUNTER — PATIENT MESSAGE (OUTPATIENT)
Dept: INTERNAL MEDICINE CLINIC | Facility: CLINIC | Age: 32
End: 2022-03-08

## 2022-03-09 NOTE — TELEPHONE ENCOUNTER
She is already immune to MMR hep B and varicella given her positive ab titers before so doesn't need to get vaccinated  Check if her last tdap is in last 10 yrs if not then she will need it

## 2022-03-30 ENCOUNTER — EMPLOYEE HEALTH (OUTPATIENT)
Dept: OTHER | Facility: HOSPITAL | Age: 32
End: 2022-03-30
Attending: PREVENTIVE MEDICINE

## 2022-03-30 DIAGNOSIS — Z11.1 SCREENING-PULMONARY TB: Primary | ICD-10-CM

## 2022-03-30 PROCEDURE — 86480 TB TEST CELL IMMUN MEASURE: CPT

## 2022-04-01 LAB
M TB IFN-G CD4+ T-CELLS BLD-ACNC: 0.09 IU/ML
M TB TUBERC IFN-G BLD QL: NEGATIVE
M TB TUBERC IGNF/MITOGEN IGNF CONTROL: >10 IU/ML
QFT TB1 AG MINUS NIL: 0.04 IU/ML
QFT TB2 AG MINUS NIL: 0.07 IU/ML

## 2022-09-16 ENCOUNTER — NURSE TRIAGE (OUTPATIENT)
Dept: INTERNAL MEDICINE CLINIC | Facility: CLINIC | Age: 32
End: 2022-09-16

## 2022-09-16 ENCOUNTER — OFFICE VISIT (OUTPATIENT)
Dept: INTERNAL MEDICINE CLINIC | Facility: CLINIC | Age: 32
End: 2022-09-16
Payer: COMMERCIAL

## 2022-09-16 ENCOUNTER — PATIENT MESSAGE (OUTPATIENT)
Dept: INTERNAL MEDICINE CLINIC | Facility: CLINIC | Age: 32
End: 2022-09-16

## 2022-09-16 VITALS
HEIGHT: 59 IN | SYSTOLIC BLOOD PRESSURE: 97 MMHG | OXYGEN SATURATION: 98 % | BODY MASS INDEX: 21.87 KG/M2 | HEART RATE: 83 BPM | WEIGHT: 108.5 LBS | DIASTOLIC BLOOD PRESSURE: 66 MMHG | TEMPERATURE: 99 F

## 2022-09-16 DIAGNOSIS — R21 RASH: ICD-10-CM

## 2022-09-16 DIAGNOSIS — L72.3 INFECTED SEBACEOUS CYST: Primary | ICD-10-CM

## 2022-09-16 DIAGNOSIS — L08.9 INFECTED SEBACEOUS CYST: Primary | ICD-10-CM

## 2022-09-16 PROCEDURE — 99213 OFFICE O/P EST LOW 20 MIN: CPT | Performed by: INTERNAL MEDICINE

## 2022-09-16 PROCEDURE — 3078F DIAST BP <80 MM HG: CPT | Performed by: INTERNAL MEDICINE

## 2022-09-16 PROCEDURE — 3008F BODY MASS INDEX DOCD: CPT | Performed by: INTERNAL MEDICINE

## 2022-09-16 PROCEDURE — 3074F SYST BP LT 130 MM HG: CPT | Performed by: INTERNAL MEDICINE

## 2022-09-16 RX ORDER — CLOTRIMAZOLE AND BETAMETHASONE DIPROPIONATE 10; .64 MG/G; MG/G
1 CREAM TOPICAL 2 TIMES DAILY PRN
Qty: 30 G | Refills: 0 | Status: SHIPPED | OUTPATIENT
Start: 2022-09-16

## 2022-09-16 RX ORDER — CEPHALEXIN 500 MG/1
500 CAPSULE ORAL 2 TIMES DAILY
Qty: 14 CAPSULE | Refills: 0 | Status: SHIPPED | OUTPATIENT
Start: 2022-09-16

## 2022-09-16 RX ORDER — BIOTIN 10 MG
1 TABLET ORAL
COMMUNITY

## 2022-09-16 NOTE — TELEPHONE ENCOUNTER
From: Caretha Castleman  To: Sarah Beth Mendez MD  Sent: 9/16/2022 3:39 AM CDT  Subject: Concerning Skin Issue    Good morning doc,    I had been feeling discomfort on my right buttock for more than a month now. It started as a small to medium feel lump deep seated in my butt cheek, then now it feels more on the surface but it started growing blister. I would see scant serous drain on my underwater at times but now it increased and also I started noticing serosanguineous drain. Attached is a photo for your reference. Please advise. Thank you. (I observed it more tender during my menstruation.  I believe it's also affected by my hormones.)

## 2022-09-16 NOTE — TELEPHONE ENCOUNTER
Image (photo) included on patient's Lehohart message.    See other Patient Message Encounter    RN - Please triage

## 2022-09-16 NOTE — TELEPHONE ENCOUNTER
----- Message from [address] [place] [address] Sharee Verodonnell sent at 9/16/2022  3:39 AM CDT -----  Regarding: Concerning Skin Issue  Good morning doc,    I had been feeling discomfort on my right buttock for more than a month now. It started as a small to medium feel lump deep seated in my butt cheek, then now it feels more on the surface but it started growing blister. I would see scant serous drain on my underwater at times but now it increased and also I started noticing serosanguineous drain. Attached is a photo for your reference. Please advise. Thank you. (I observed it more tender during my menstruation.  I believe it's also affected by my hormones.)

## 2022-09-19 RX ORDER — ALPRAZOLAM 0.25 MG/1
0.25 TABLET ORAL NIGHTLY PRN
Qty: 30 TABLET | Refills: 0 | Status: SHIPPED | OUTPATIENT
Start: 2022-09-19

## 2022-09-19 NOTE — TELEPHONE ENCOUNTER
Patient is requesting a refill for ALPRAZolam 0.25 MG Oral Tab. Please advise.  Patient is out of medication

## 2022-09-22 ENCOUNTER — MED REC SCAN ONLY (OUTPATIENT)
Dept: INTERNAL MEDICINE CLINIC | Facility: CLINIC | Age: 32
End: 2022-09-22

## 2022-10-26 ENCOUNTER — LAB REQUISITION (OUTPATIENT)
Dept: SURGERY | Age: 32
End: 2022-10-26
Payer: COMMERCIAL

## 2022-10-26 DIAGNOSIS — Z01.818 PREOPERATIVE EXAMINATION, UNSPECIFIED: ICD-10-CM

## 2022-10-26 LAB — SARS-COV-2 RNA RESP QL NAA+PROBE: NOT DETECTED

## 2022-10-28 ENCOUNTER — SURGERY CENTER DOCUMENTATION (OUTPATIENT)
Dept: SURGERY | Age: 32
End: 2022-10-28

## 2022-10-28 RX ORDER — TRAMADOL HYDROCHLORIDE 50 MG/1
TABLET ORAL EVERY 4 HOURS PRN
Qty: 5 TABLET | Refills: 0 | Status: SHIPPED | OUTPATIENT
Start: 2022-10-28

## 2022-10-28 NOTE — PROCEDURES
[facility] OPERATIVE REPORT:     PATIENT NAME: Nacho Colon  : 1990   MRN: [id number]    DATE OF OPERATION:   10/28/22    PREOPERATIVE DIAGNOSIS: Anal fistula     POSTOPERATIVE DIAGNOSIS: Anal fistula     PROCEDURE PERFORMED: Exam under anesthesia, anal fistulotomy and placement of seton     SURGEON:  Nia Kirkland MD    ASST: Poli Duffy CSA (Assistant helped position patient and helped with positioning, retraction, suturing, closure, dressings, etc.)    ANESTHESIA: General anesthesia    ESTIMATED BLOOD LOSS:   5 ml     The patient understood the indications, details and potential risks and complications including bleeding, infection, minor incontinence, recurrence, need for reoperation, etc. The patient consented to the procedure. The patient was brought to the operating room and induced under General anesthesia. The patient was placed in prone jacknife position. Local anesthetic in the form of 0.25% Marcaine with epinephrine was injected in the form of pudental nerve block and field block. A detailed exam under anesthesia was performed. The fistula tract was injected with hydrogen peroxide and gently probed. The fistula internal opening was seen at the 12 o'clock position. A fistulotomy was performed up to the anal sphincter. Anal seton was placed. The skin edges were sutured with 2-0 Chromic continuous suture for hemostasis. The patient tolerated the procedure well and went to recovery in stable condition.     Nia Kirkland MD

## 2022-11-16 ENCOUNTER — IMMUNIZATION (OUTPATIENT)
Dept: LAB | Facility: HOSPITAL | Age: 32
End: 2022-11-16
Attending: PREVENTIVE MEDICINE
Payer: COMMERCIAL

## 2022-11-16 DIAGNOSIS — Z23 NEED FOR VACCINATION: Primary | ICD-10-CM

## 2022-11-16 PROCEDURE — 90471 IMMUNIZATION ADMIN: CPT

## 2023-02-16 ENCOUNTER — OFFICE VISIT (OUTPATIENT)
Dept: DERMATOLOGY CLINIC | Facility: CLINIC | Age: 33
End: 2023-02-16

## 2023-02-16 DIAGNOSIS — L70.0 ACNE VULGARIS: ICD-10-CM

## 2023-02-16 DIAGNOSIS — L30.9 ECZEMA, UNSPECIFIED TYPE: Primary | ICD-10-CM

## 2023-02-16 PROCEDURE — 99204 OFFICE O/P NEW MOD 45 MIN: CPT | Performed by: STUDENT IN AN ORGANIZED HEALTH CARE EDUCATION/TRAINING PROGRAM

## 2023-02-16 RX ORDER — CLOBETASOL PROPIONATE 0.5 MG/G
CREAM TOPICAL
Qty: 60 G | Refills: 3 | Status: SHIPPED | OUTPATIENT
Start: 2023-02-16

## 2023-02-16 RX ORDER — AMOXICILLIN AND CLAVULANATE POTASSIUM 875; 125 MG/1; MG/1
1 TABLET, FILM COATED ORAL 2 TIMES DAILY
COMMUNITY
Start: 2022-12-06

## 2023-02-16 RX ORDER — SPIRONOLACTONE 25 MG/1
25 TABLET ORAL 2 TIMES DAILY
Qty: 120 TABLET | Refills: 2 | Status: SHIPPED | OUTPATIENT
Start: 2023-02-16

## 2023-05-18 ENCOUNTER — TELEPHONE (OUTPATIENT)
Dept: DERMATOLOGY CLINIC | Facility: CLINIC | Age: 33
End: 2023-05-18

## 2023-05-18 RX ORDER — SPIRONOLACTONE 25 MG/1
25 TABLET ORAL 2 TIMES DAILY
Qty: 120 TABLET | Refills: 2 | Status: SHIPPED | OUTPATIENT
Start: 2023-05-18

## 2023-05-18 RX ORDER — CLOBETASOL PROPIONATE 0.5 MG/G
CREAM TOPICAL
Qty: 60 G | Refills: 3 | Status: SHIPPED | OUTPATIENT
Start: 2023-05-18

## 2023-05-18 NOTE — TELEPHONE ENCOUNTER
LOV 2/16/23. Pt had appt scheduled for today, 5/18/23 at 1pm and called to cancel at 12pm.  Pt rescheduled for 6/6/23 and asking if she can have a refill until her appt. Please advise. Thank you.

## 2023-09-19 ENCOUNTER — OFFICE VISIT (OUTPATIENT)
Facility: CLINIC | Age: 33
End: 2023-09-19
Payer: COMMERCIAL

## 2023-09-19 ENCOUNTER — LAB ENCOUNTER (OUTPATIENT)
Dept: LAB | Age: 33
End: 2023-09-19
Attending: OBSTETRICS & GYNECOLOGY
Payer: COMMERCIAL

## 2023-09-19 VITALS
WEIGHT: 113 LBS | DIASTOLIC BLOOD PRESSURE: 64 MMHG | SYSTOLIC BLOOD PRESSURE: 108 MMHG | BODY MASS INDEX: 22.19 KG/M2 | HEART RATE: 76 BPM | HEIGHT: 60 IN

## 2023-09-19 DIAGNOSIS — Z01.419 ENCOUNTER FOR WELL WOMAN EXAM WITH ROUTINE GYNECOLOGICAL EXAM: Primary | ICD-10-CM

## 2023-09-19 DIAGNOSIS — N92.6 IRREGULAR MENSES: ICD-10-CM

## 2023-09-19 DIAGNOSIS — Z12.4 CERVICAL CANCER SCREENING: ICD-10-CM

## 2023-09-19 LAB
FSH SERPL-ACNC: 6.6 MIU/ML
LH SERPL-ACNC: 4.4 MIU/ML
TSI SER-ACNC: 1.49 MIU/ML (ref 0.36–3.74)

## 2023-09-19 PROCEDURE — 3078F DIAST BP <80 MM HG: CPT | Performed by: OBSTETRICS & GYNECOLOGY

## 2023-09-19 PROCEDURE — 83002 ASSAY OF GONADOTROPIN (LH): CPT | Performed by: OBSTETRICS & GYNECOLOGY

## 2023-09-19 PROCEDURE — 87624 HPV HI-RISK TYP POOLED RSLT: CPT | Performed by: OBSTETRICS & GYNECOLOGY

## 2023-09-19 PROCEDURE — 3008F BODY MASS INDEX DOCD: CPT | Performed by: OBSTETRICS & GYNECOLOGY

## 2023-09-19 PROCEDURE — 83520 IMMUNOASSAY QUANT NOS NONAB: CPT | Performed by: OBSTETRICS & GYNECOLOGY

## 2023-09-19 PROCEDURE — 99395 PREV VISIT EST AGE 18-39: CPT | Performed by: OBSTETRICS & GYNECOLOGY

## 2023-09-19 PROCEDURE — 88175 CYTOPATH C/V AUTO FLUID REDO: CPT | Performed by: OBSTETRICS & GYNECOLOGY

## 2023-09-19 PROCEDURE — 36415 COLL VENOUS BLD VENIPUNCTURE: CPT | Performed by: OBSTETRICS & GYNECOLOGY

## 2023-09-19 PROCEDURE — 84443 ASSAY THYROID STIM HORMONE: CPT | Performed by: OBSTETRICS & GYNECOLOGY

## 2023-09-19 PROCEDURE — 83001 ASSAY OF GONADOTROPIN (FSH): CPT | Performed by: OBSTETRICS & GYNECOLOGY

## 2023-09-19 PROCEDURE — 3074F SYST BP LT 130 MM HG: CPT | Performed by: OBSTETRICS & GYNECOLOGY

## 2023-09-19 NOTE — PROGRESS NOTES
Tierra Salazar is a 26year old female  Patient's last menstrual period was 2023 (approximate). Patient presents with:  Physical  .Patient had 4 days bleeding midcycle 2 months ago, no birth control for 5 years - no pregnancy, never had a pap smear    OBSTETRICS HISTORY:  OB History    Para Term  AB Living   0 0 0 0 0 0   SAB IAB Ectopic Multiple Live Births   0 0 0 0 0       GYNE HISTORY:  Periods regular monthly      Sexual activity:   Yes      Partners:   Male      Comment:   boyfriend                    MEDICAL HISTORY:  Past Medical History:   Diagnosis Date    Allergic rhinitis     Ankle joint effusion, right     Anxiety     Hyperlipidemia     Traumatic brain injury ([facility] [address]     2021       SURGICAL HISTORY:  No past surgical history on file.     SOCIAL HISTORY:  Social History    Socioeconomic History      Marital status: Single      Spouse name: Not on file      Number of children: Not on file      Years of education: Not on file      Highest education level: Not on file    Occupational History      Not on file    Tobacco Use      Smoking status: Never      Smokeless tobacco: Never      Tobacco comments: no expose at home    Vaping Use      Vaping Use: Never used    Substance and Sexual Activity      Alcohol use: Yes        Comment: rare      Drug use: No      Sexual activity: Yes        Partners: Male        Comment: boyfriend    Other Topics      Concerns:         Service: Not Asked        Blood Transfusions: Not Asked        Caffeine Concern: Yes          tea        Occupational Exposure: Not Asked        Hobby Hazards: Not Asked        Sleep Concern: Not Asked        Stress Concern: Not Asked        Weight Concern: Not Asked        Special Diet: Not Asked        Back Care: Not Asked        Exercise: Yes          PT 4 times a week         Bike Helmet: Not Asked        Seat Belt: Not Asked        Self-Exams: Not Asked        Grew up on a farm: Not Asked        History of tanning: Not Asked        Outdoor occupation: Not Asked        Breast feeding: Not Asked        Reaction to local anesthetic: No        Pt has a pacemaker: No        Pt has a defibrillator: No    Social History Narrative      Not on file    Social Determinants of Health  Financial Resource Strain: Not on file  Food Insecurity: Not on file  Transportation Needs: Not on file  Physical Activity: Not on file  Stress: Not on file  Social Connections: Not on file  Housing Stability: Not on file    FAMILY HISTORY:  Family History   Problem Relation Age of Onset    Hypertension Father     Stroke Father     Lipids Father     Other (Other) Father         ESRD     Cancer Maternal Grandfather         lung    Thyroid disease Mother     Lipids Mother     No Known Problems Sister     No Known Problems Brother        MEDICATIONS:    Current Outpatient Medications:     clobetasol 0.05 % External Cream, Apply to affected areas on legs twice daily  Monday-Friday until clear., Disp: 60 g, Rfl: 3    spironolactone 25 MG Oral Tab, Take 25mg daily x 2 weeks, 50mg daily x 2 weeks, then 75mg daily x 2 weeks, then 100mg once daily as maintenance dose., Disp: 120 tablet, Rfl: 2    clobetasol 0.05 % External Cream, Apply to affected areas on legs twice daily  Monday-Friday until clear., Disp: 60 g, Rfl: 3    amoxicillin clavulanate 875-125 MG Oral Tab, Take 1 tablet by mouth 2 (two) times daily. (Patient not taking: Reported on 2/16/2023), Disp: , Rfl:     traMADol 50 MG Oral Tab, Take 1-2 tablets ( mg total) by mouth every 4 (four) hours as needed for Pain. (Patient not taking: Reported on 2/16/2023), Disp: 5 tablet, Rfl: 0    ALPRAZolam 0.25 MG Oral Tab, Take 1 tablet (0.25 mg total) by mouth nightly as needed for Anxiety. , Disp: 30 tablet, Rfl: 0    Multiple Vitamins-Minerals (IMMUNE SUPPORT) Oral Chew Tab, Chew 1 tablet by mouth., Disp: , Rfl:     cephalexin 500 MG Oral Cap, Take 1 capsule (500 mg total) by mouth 2 (two) times daily. (Patient not taking: Reported on 2/16/2023), Disp: 14 capsule, Rfl: 0    clotrimazole-betamethasone 1-0.05 % External Cream, Apply 1 Application topically 2 (two) times daily as needed. , Disp: 30 g, Rfl: 0    benzonatate (TESSALON PERLES) 100 MG Oral Cap, Take 1 capsule (100 mg total) by mouth 3 (three) times daily as needed for cough. (Patient not taking: Reported on 9/16/2022), Disp: 30 capsule, Rfl: 0    Calcium Carbonate-Vitamin D (CALCIUM 500 + D OR), Take by mouth., Disp: , Rfl:     acetaminophen 500 MG Oral Tab, Take 500 mg by mouth 2 (two) times daily as needed for Pain. (Patient not taking: Reported on 2/16/2023), Disp: , Rfl:     Vitamin C 500 MG Oral Chew Tab, Chew 1 tablet (500 mg total) by mouth daily. , Disp: , Rfl:     ALLERGIES:    Codeine                 RASH      Review of Systems:  Constitutional:  Denies fatigue, night sweats, hot flashes  Eyes:  denies blurred or double vision  Cardiovascular:  denies chest pain or palpitations  Respiratory:  denies shortness of breath  Gastrointestinal:  denies heartburn, abdominal pain, diarrhea or constipation  Genitourinary:  denies dysuria, incontinence, abnormal vaginal discharge, vaginal itching  Musculoskeletal:  denies back pain. Skin/Breast:  Denies any breast pain, lumps, or discharge. Neurological:  denies headaches, extremity weakness or numbness. Psychiatric: denies depression or anxiety. Endocrine:   denies excessive thirst or urination. Heme/Lymph:  denies history of anemia, easy bruising or bleeding.       PHYSICAL EXAM:   Constitutional: well developed, well nourished  Head/Face: normocephalic  Neck/Thyroid: thyroid symmetric, no thyromegaly, no nodules, no adenopathy  Lymphatic:no abnormal supraclavicular or axillary adenopathy is noted  Breast: normal without palpable masses, tenderness, asymmetry, nipple discharge, nipple retraction or skin changes  Abdomen:  soft, nontender, nondistended, no masses  Skin/Hair: no unusual rashes or bruises  Extremities: no edema, no cyanosis  Psychiatric:  Oriented to time, place, person and situation. Appropriate mood and affect    Pelvic Exam:  External Genitalia: normal appearance, hair distribution, and no lesions  Urethral Meatus:  normal in size, location, without lesions and prolapse  Bladder:  No fullness, masses or tenderness  Vagina:  Normal appearance without lesions, no abnormal discharge  Cervix:  Normal without tenderness on motion  Uterus: normal in size, contour, position, mobility, without tenderness  Adnexa: normal without masses or tenderness  Perineum: normal  Anus: no hemorroids     Assessment & Plan:  Diagnoses and all orders for this visit:    Encounter for well woman exam with routine gynecological exam    Cervical cancer screening  -     Hpv High Risk , Thin Prep Collect; Future  -     ThinPrep PAP Smear B; Future    Irregular menses  -     FSH AND LH [9473] [Q]  -     Anti-Mullerian Hormone  -     TSH W REFLEX TO FREE T4 [45271][Q]  -     XR HYSTEROSALPINGOGRAM (CPT=74740/47760);  Future          - semen check

## 2023-09-20 LAB — HPV I/H RISK 1 DNA SPEC QL NAA+PROBE: NEGATIVE

## 2023-09-22 LAB — MULLERIAN AMH: 0.52 NG/ML

## 2023-09-25 NOTE — PROGRESS NOTES
Pt aware of results & recommendations to follow up with a fertility specialist. Dr. Will Nagy information sent to pt's [employer]. To call if she does not receive. Verbalized understanding.

## 2023-12-01 ENCOUNTER — OFFICE VISIT (OUTPATIENT)
Facility: CLINIC | Age: 33
End: 2023-12-01
Payer: COMMERCIAL

## 2023-12-01 VITALS
DIASTOLIC BLOOD PRESSURE: 65 MMHG | HEIGHT: 60 IN | WEIGHT: 112.81 LBS | SYSTOLIC BLOOD PRESSURE: 110 MMHG | HEART RATE: 82 BPM | BODY MASS INDEX: 22.15 KG/M2

## 2023-12-01 DIAGNOSIS — Z01.812 PRE-PROCEDURAL LABORATORY EXAMINATION: ICD-10-CM

## 2023-12-01 DIAGNOSIS — R87.619 ATYPICAL GLANDULAR CELLS OF UNDETERMINED SIGNIFICANCE (AGUS) ON CERVICAL PAP SMEAR: Primary | ICD-10-CM

## 2023-12-01 LAB
CONTROL LINE PRESENT WITH A CLEAR BACKGROUND (YES/NO): YES YES/NO
KIT LOT #: NORMAL NUMERIC
PREGNANCY TEST, URINE: NEGATIVE

## 2023-12-01 PROCEDURE — 3078F DIAST BP <80 MM HG: CPT | Performed by: OBSTETRICS & GYNECOLOGY

## 2023-12-01 PROCEDURE — 88305 TISSUE EXAM BY PATHOLOGIST: CPT | Performed by: OBSTETRICS & GYNECOLOGY

## 2023-12-01 PROCEDURE — 81025 URINE PREGNANCY TEST: CPT | Performed by: OBSTETRICS & GYNECOLOGY

## 2023-12-01 PROCEDURE — 3074F SYST BP LT 130 MM HG: CPT | Performed by: OBSTETRICS & GYNECOLOGY

## 2023-12-01 PROCEDURE — 57454 BX/CURETT OF CERVIX W/SCOPE: CPT | Performed by: OBSTETRICS & GYNECOLOGY

## 2023-12-01 PROCEDURE — 3008F BODY MASS INDEX DOCD: CPT | Performed by: OBSTETRICS & GYNECOLOGY

## 2023-12-01 NOTE — PROGRESS NOTES
Colpo w/Cx Biopsy and ECC    Pregnancy Results: negative from urine test     Discussed high/low risk HPV, dormant vs active state of the virus, unknown exposure time. Discussed pap smear being a screening test and if abnormal follows by colposcopic examination of the cervix with biopsy. This procedure will provide us with diagnosis of mild,moderate or severe dysplasia. T    Consent signed. Procedure discussed with patient in detail including indication, risk, benefits, alternatives and complications. Pre-Procedure:  Pre-Meds:    Cervix prepped with:  Acetic acid    Procedure:  Under satisfactory analgesia, the patient was put in the dorsal lithotomy position. [place] speculum was placed in the vagina. Under colposcopic examination the transition zone was seen in entirety. ECC done then cervical biopsy taken at 12 and 6 o'clock where increased acetowhite changes noted. Attempted EMB - could not dilate cervix due to patients discomfort   Patient tolerated procedure well.       Findings:  Acetowhite epithelium    Impression:  HPV changes    Vinod Abreu DO  3:49 PM  12/1/2023

## 2023-12-22 ENCOUNTER — HOSPITAL ENCOUNTER (OUTPATIENT)
Dept: GENERAL RADIOLOGY | Facility: HOSPITAL | Age: 33
Discharge: HOME | End: 2023-12-22
Attending: OBSTETRICS & GYNECOLOGY
Payer: COMMERCIAL

## 2023-12-22 DIAGNOSIS — N92.6 IRREGULAR MENSES: ICD-10-CM

## 2023-12-22 PROCEDURE — 58340 CATHETER FOR HYSTEROGRAPHY: CPT | Performed by: OBSTETRICS & GYNECOLOGY

## 2023-12-22 PROCEDURE — 74740 X-RAY FEMALE GENITAL TRACT: CPT | Performed by: OBSTETRICS & GYNECOLOGY

## 2024-03-09 RX ORDER — ALPRAZOLAM 0.25 MG/1
0.25 TABLET ORAL NIGHTLY PRN
Qty: 30 TABLET | Refills: 0 | Status: CANCELLED | OUTPATIENT
Start: 2024-03-09

## 2024-03-21 ENCOUNTER — OFFICE VISIT (OUTPATIENT)
Dept: INTERNAL MEDICINE CLINIC | Facility: CLINIC | Age: 34
End: 2024-03-21
Payer: COMMERCIAL

## 2024-03-21 VITALS
SYSTOLIC BLOOD PRESSURE: 102 MMHG | WEIGHT: 112.38 LBS | HEART RATE: 66 BPM | DIASTOLIC BLOOD PRESSURE: 66 MMHG | TEMPERATURE: 98 F | BODY MASS INDEX: 22.06 KG/M2 | OXYGEN SATURATION: 99 % | HEIGHT: 60 IN

## 2024-03-21 DIAGNOSIS — R07.2 PRECORDIAL PAIN: ICD-10-CM

## 2024-03-21 DIAGNOSIS — Z00.00 ANNUAL PHYSICAL EXAM: Primary | ICD-10-CM

## 2024-03-21 DIAGNOSIS — F41.9 ANXIETY: ICD-10-CM

## 2024-03-21 PROCEDURE — 99395 PREV VISIT EST AGE 18-39: CPT | Performed by: INTERNAL MEDICINE

## 2024-03-21 RX ORDER — SPIRONOLACTONE 50 MG/1
TABLET, FILM COATED ORAL
COMMUNITY
Start: 2023-09-11 | End: 2024-03-21

## 2024-03-21 RX ORDER — TRETINOIN 0.5 MG/G
CREAM TOPICAL
COMMUNITY
Start: 2023-10-09

## 2024-03-21 RX ORDER — ALPRAZOLAM 0.25 MG/1
0.25 TABLET ORAL NIGHTLY PRN
Qty: 30 TABLET | Refills: 0 | Status: SHIPPED | OUTPATIENT
Start: 2024-03-21

## 2024-03-21 NOTE — PROGRESS NOTES
Subjective:     Patient ID: Mayelin Flores is a 33 year old female.    Patient present today for her annual physical.         History/Other:   Review of Systems   Constitutional: Negative.  Negative for appetite change, fever and unexpected weight change.   HENT: Negative.     Eyes: Negative.    Respiratory: Negative.          No hemoptysis   Cardiovascular: Negative.  Negative for chest pain, palpitations and leg swelling.         No pnd/orthopnea   Gastrointestinal: Negative.          No hematemesis   Genitourinary: Negative.    Allergic/Immunologic: Positive for environmental allergies. Negative for food allergies and immunocompromised state.   Neurological:  Negative for syncope.   Hematological:  Negative for adenopathy. Bruises/bleeds easily.     Current Outpatient Medications   Medication Sig Dispense Refill    Tretinoin 0.05 % External Cream       clobetasol 0.05 % External Cream Apply to affected areas on legs twice daily  Monday-Friday until clear. 60 g 3    clotrimazole-betamethasone 1-0.05 % External Cream Apply 1 Application topically 2 (two) times daily as needed. 30 g 0    spironolactone 50 MG Oral Tab  (Patient not taking: Reported on 3/21/2024)      clobetasol 0.05 % External Cream Apply to affected areas on legs twice daily  Monday-Friday until clear. 60 g 3    ALPRAZolam 0.25 MG Oral Tab Take 1 tablet (0.25 mg total) by mouth nightly as needed for Anxiety. (Patient not taking: Reported on 12/1/2023) 30 tablet 0    Multiple Vitamins-Minerals (IMMUNE SUPPORT) Oral Chew Tab Chew 1 tablet by mouth. (Patient not taking: Reported on 3/21/2024)      Vitamin C 500 MG Oral Chew Tab Chew 1 tablet (500 mg total) by mouth daily. (Patient not taking: Reported on 3/21/2024)       Allergies:  Allergies   Allergen Reactions    Codeine RASH       Past Medical History:   Diagnosis Date    Abnormal uterine bleeding 07/18/2023    Abnormal bleeding for about 5 days, no discomfort.    Allergic rhinitis     Ankle  joint effusion, right     Anxiety     Dysmenorrhea     Every month.    Hyperlipidemia     Pap smear for cervical cancer screening 2023    Atypical Glandular Cells of Endocervical Origin Abnormal    Traumatic brain injury (HCC)     2021      History reviewed. No pertinent surgical history.   Family History   Problem Relation Age of Onset    Hypertension Father         Hx: SVT, HLD, UTI, Kidney transplant, Kidney infection    Stroke Father     Lipids Father     Other (Other) Father         ESRD     Cancer Maternal Grandfather         lung    Thyroid disease Mother     Lipids Mother         HLD    Cancer Paternal Grandfather         Lung cancer with bone and brain mets.    No Known Problems Sister     No Known Problems Brother       Social History:   Social History     Socioeconomic History    Marital status: Single   Tobacco Use    Smoking status: Former     Types: Cigarettes     Quit date: 2018     Years since quittin.3    Smokeless tobacco: Never    Tobacco comments:     no expose at home   Vaping Use    Vaping Use: Never used   Substance and Sexual Activity    Alcohol use: Not Currently     Comment: rare    Drug use: Never    Sexual activity: Yes     Partners: Male     Comment: boyfriend   Other Topics Concern    Caffeine Concern Yes     Comment: Restlessness    Stress Concern Yes     Comment: From work.    Weight Concern Yes     Comment: Weight gain.    Special Diet No    Exercise No    Seat Belt No    Reaction to local anesthetic No    Pt has a pacemaker No    Pt has a defibrillator No        Objective:   Physical Exam  Constitutional:       General: She is not in acute distress.     Appearance: She is well-developed. She is not ill-appearing, toxic-appearing or diaphoretic.   HENT:      Head: Normocephalic and atraumatic.      Right Ear: Tympanic membrane, ear canal and external ear normal.      Left Ear: Tympanic membrane, ear canal and external ear normal.      Nose: Nose normal.       Mouth/Throat:      Pharynx: No oropharyngeal exudate.   Eyes:      General: No scleral icterus.        Right eye: No discharge.         Left eye: No discharge.      Conjunctiva/sclera: Conjunctivae normal.      Pupils: Pupils are equal, round, and reactive to light.   Neck:      Vascular: No JVD.   Cardiovascular:      Rate and Rhythm: Normal rate and regular rhythm.      Pulses: Normal pulses.      Heart sounds: Normal heart sounds. No murmur heard.  Pulmonary:      Effort: Pulmonary effort is normal. No respiratory distress.      Breath sounds: Normal breath sounds. No wheezing or rales.   Abdominal:      General: Bowel sounds are normal. There is no distension.      Palpations: Abdomen is soft. There is no mass.      Tenderness: There is no abdominal tenderness. There is no guarding or rebound.   Musculoskeletal:         General: No tenderness. Normal range of motion.      Cervical back: Normal range of motion and neck supple. No rigidity or tenderness.      Right lower leg: No edema.      Left lower leg: No edema.   Lymphadenopathy:      Cervical: No cervical adenopathy.   Skin:     General: Skin is warm and dry.      Coloration: Skin is not jaundiced or pale.      Findings: No rash.   Neurological:      Mental Status: She is alert and oriented to person, place, and time.         Assessment & Plan:   (Z00.00) Annual physical exam  (primary encounter diagnosis)  Plan: CBC With Differential With Platelet, Comp         Metabolic Panel (14), Hemoglobin A1C, Lipid         Panel, Urinalysis, Routine, TSH W Reflex To         Free T4, Prothrombin Time (PT) [E], PTT,         Activated [E]        Routine labs ordered. She also complained of some bruising; we will check coag profile.  Pt is current with her papsmear with her gyne.     (F41.9) Anxiety  Plan: ALPRAZolam 0.25 MG Oral Tab        Pt havng some anxeity due to illness of both father and brother. I gave her refill of xanax.     (R07.2) Precordial pain  Plan: EKG  12 Lead to be performed at [facility]        Pt had mentioned of having occasional pain on the left upper chest, had been going on at rest, non exertional and no assoc symptoms; had been having them for several years. This sounds atypical.  We will do EKG.        No orders of the defined types were placed in this encounter.      Meds This Visit:  Requested Prescriptions      No prescriptions requested or ordered in this encounter       Imaging & Referrals:  None

## 2024-03-29 ENCOUNTER — EKG ENCOUNTER (OUTPATIENT)
Dept: LAB | Facility: HOSPITAL | Age: 34
End: 2024-03-29
Attending: INTERNAL MEDICINE
Payer: COMMERCIAL

## 2024-03-29 DIAGNOSIS — R07.2 PRECORDIAL PAIN: ICD-10-CM

## 2024-03-29 PROCEDURE — 93005 ELECTROCARDIOGRAM TRACING: CPT

## 2024-03-29 PROCEDURE — 93010 ELECTROCARDIOGRAM REPORT: CPT | Performed by: INTERNAL MEDICINE

## 2024-03-31 LAB
ATRIAL RATE: 69 BPM
P AXIS: 30 DEGREES
P-R INTERVAL: 128 MS
Q-T INTERVAL: 398 MS
QRS DURATION: 84 MS
QTC CALCULATION (BEZET): 426 MS
R AXIS: 79 DEGREES
T AXIS: 45 DEGREES
VENTRICULAR RATE: 69 BPM

## 2024-04-10 ENCOUNTER — PATIENT MESSAGE (OUTPATIENT)
Dept: INTERNAL MEDICINE CLINIC | Facility: CLINIC | Age: 34
End: 2024-04-10

## 2024-04-10 ENCOUNTER — LAB ENCOUNTER (OUTPATIENT)
Dept: LAB | Age: 34
End: 2024-04-10
Attending: INTERNAL MEDICINE
Payer: COMMERCIAL

## 2024-04-10 DIAGNOSIS — R74.8 LOW SERUM ASPARTATE AMINOTRANSFERASE (AST): Primary | ICD-10-CM

## 2024-04-10 DIAGNOSIS — E87.1 HYPONATREMIA: ICD-10-CM

## 2024-04-10 DIAGNOSIS — Z00.00 ANNUAL PHYSICAL EXAM: ICD-10-CM

## 2024-04-10 LAB
ALBUMIN SERPL-MCNC: 4.1 G/DL (ref 3.4–5)
ALBUMIN/GLOB SERPL: 1 {RATIO} (ref 1–2)
ALP LIVER SERPL-CCNC: 55 U/L
ALT SERPL-CCNC: 16 U/L
ANION GAP SERPL CALC-SCNC: 5 MMOL/L (ref 0–18)
APTT PPP: 32.4 SECONDS (ref 23.3–35.6)
AST SERPL-CCNC: 14 U/L (ref 15–37)
BASOPHILS # BLD AUTO: 0.09 X10(3) UL (ref 0–0.2)
BASOPHILS NFR BLD AUTO: 1 %
BILIRUB SERPL-MCNC: 0.3 MG/DL (ref 0.1–2)
BILIRUB UR QL STRIP.AUTO: NEGATIVE
BUN BLD-MCNC: 14 MG/DL (ref 9–23)
CALCIUM BLD-MCNC: 9.1 MG/DL (ref 8.5–10.1)
CHLORIDE SERPL-SCNC: 103 MMOL/L (ref 98–112)
CHOLEST SERPL-MCNC: 257 MG/DL (ref ?–200)
CLARITY UR REFRACT.AUTO: CLEAR
CO2 SERPL-SCNC: 27 MMOL/L (ref 21–32)
CREAT BLD-MCNC: 0.72 MG/DL
EGFRCR SERPLBLD CKD-EPI 2021: 112 ML/MIN/1.73M2 (ref 60–?)
EOSINOPHIL # BLD AUTO: 0.21 X10(3) UL (ref 0–0.7)
EOSINOPHIL NFR BLD AUTO: 2.4 %
ERYTHROCYTE [DISTWIDTH] IN BLOOD BY AUTOMATED COUNT: 12.9 %
EST. AVERAGE GLUCOSE BLD GHB EST-MCNC: 117 MG/DL (ref 68–126)
FASTING PATIENT LIPID ANSWER: YES
FASTING STATUS PATIENT QL REPORTED: YES
GLOBULIN PLAS-MCNC: 4.2 G/DL (ref 2.8–4.4)
GLUCOSE BLD-MCNC: 91 MG/DL (ref 70–99)
GLUCOSE UR STRIP.AUTO-MCNC: NORMAL MG/DL
HBA1C MFR BLD: 5.7 % (ref ?–5.7)
HCT VFR BLD AUTO: 41.5 %
HDLC SERPL-MCNC: 68 MG/DL (ref 40–59)
HGB BLD-MCNC: 13.8 G/DL
IMM GRANULOCYTES # BLD AUTO: 0.03 X10(3) UL (ref 0–1)
IMM GRANULOCYTES NFR BLD: 0.3 %
INR BLD: 0.95 (ref 0.8–1.2)
LDLC SERPL CALC-MCNC: 181 MG/DL (ref ?–100)
LEUKOCYTE ESTERASE UR QL STRIP.AUTO: NEGATIVE
LYMPHOCYTES # BLD AUTO: 1.74 X10(3) UL (ref 1–4)
LYMPHOCYTES NFR BLD AUTO: 19.5 %
MCH RBC QN AUTO: 27.9 PG (ref 26–34)
MCHC RBC AUTO-ENTMCNC: 33.3 G/DL (ref 31–37)
MCV RBC AUTO: 84 FL
MONOCYTES # BLD AUTO: 1.06 X10(3) UL (ref 0.1–1)
MONOCYTES NFR BLD AUTO: 11.9 %
NEUTROPHILS # BLD AUTO: 5.78 X10 (3) UL (ref 1.5–7.7)
NEUTROPHILS # BLD AUTO: 5.78 X10(3) UL (ref 1.5–7.7)
NEUTROPHILS NFR BLD AUTO: 64.9 %
NITRITE UR QL STRIP.AUTO: NEGATIVE
NONHDLC SERPL-MCNC: 189 MG/DL (ref ?–130)
OSMOLALITY SERPL CALC.SUM OF ELEC: 280 MOSM/KG (ref 275–295)
PH UR STRIP.AUTO: 6 [PH] (ref 5–8)
PLATELET # BLD AUTO: 382 10(3)UL (ref 150–450)
POTASSIUM SERPL-SCNC: 3.7 MMOL/L (ref 3.5–5.1)
PROT SERPL-MCNC: 8.3 G/DL (ref 6.4–8.2)
PROT UR STRIP.AUTO-MCNC: NEGATIVE MG/DL
PROTHROMBIN TIME: 12.7 SECONDS (ref 11.6–14.8)
RBC # BLD AUTO: 4.94 X10(6)UL
RBC UR QL AUTO: NEGATIVE
SODIUM SERPL-SCNC: 135 MMOL/L (ref 136–145)
SP GR UR STRIP.AUTO: 1.03 (ref 1–1.03)
TRIGL SERPL-MCNC: 53 MG/DL (ref 30–149)
TSI SER-ACNC: 2.14 MIU/ML (ref 0.36–3.74)
UROBILINOGEN UR STRIP.AUTO-MCNC: NORMAL MG/DL
VLDLC SERPL CALC-MCNC: 11 MG/DL (ref 0–30)
WBC # BLD AUTO: 8.9 X10(3) UL (ref 4–11)

## 2024-04-10 PROCEDURE — 80061 LIPID PANEL: CPT

## 2024-04-10 PROCEDURE — 85730 THROMBOPLASTIN TIME PARTIAL: CPT

## 2024-04-10 PROCEDURE — 83036 HEMOGLOBIN GLYCOSYLATED A1C: CPT

## 2024-04-10 PROCEDURE — 81003 URINALYSIS AUTO W/O SCOPE: CPT

## 2024-04-10 PROCEDURE — 85610 PROTHROMBIN TIME: CPT

## 2024-04-10 PROCEDURE — 36415 COLL VENOUS BLD VENIPUNCTURE: CPT

## 2024-04-10 PROCEDURE — 80053 COMPREHEN METABOLIC PANEL: CPT

## 2024-04-10 PROCEDURE — 84443 ASSAY THYROID STIM HORMONE: CPT

## 2024-04-10 PROCEDURE — 85025 COMPLETE CBC W/AUTO DIFF WBC: CPT

## 2024-04-11 NOTE — TELEPHONE ENCOUNTER
From: Mayelin Flores  To: Erik Worrell  Sent: 4/10/2024 11:35 PM CDT  Subject: Sodium and Total Protein     Good morning doc,    I forgot to mention my sodium and low and total protein is high as well. I know you're going to review my results soon. I'm just worried. Please let me know what you recommendations, im justreally worriedabout my kidneys or could be something else. Thanks.    Mayelin

## 2024-04-11 NOTE — TELEPHONE ENCOUNTER
Dr. Worrell, please see patient's MyChart message below and advise on concerns about recent blood tests. Thank you.    Result note from labs collected 4/10/24 patient is concerned about:  Collected 4/10/2024  8:10 AM    Status: Final result    Dx: Annual physical exam    Sodium  136 - 145 mmol/L 135 Low      Total Protein  6.4 - 8.2 g/dL 8.3 High

## 2024-04-14 ENCOUNTER — PATIENT MESSAGE (OUTPATIENT)
Dept: INTERNAL MEDICINE CLINIC | Facility: CLINIC | Age: 34
End: 2024-04-14

## 2024-04-15 NOTE — TELEPHONE ENCOUNTER
From: Mayelin Flores  To: Erik Worrell  Sent: 4/14/2024 8:54 AM CDT  Subject: Recommendation     Good morning doc, I read your message, thank you for your recommendation. You can order labs anytime, I'll schedule with Edward right away. Have a good day!     Mayelin

## 2024-06-25 ENCOUNTER — OFFICE VISIT (OUTPATIENT)
Facility: CLINIC | Age: 34
End: 2024-06-25

## 2024-06-25 VITALS
SYSTOLIC BLOOD PRESSURE: 110 MMHG | WEIGHT: 107 LBS | DIASTOLIC BLOOD PRESSURE: 68 MMHG | BODY MASS INDEX: 21.01 KG/M2 | HEART RATE: 88 BPM | HEIGHT: 60 IN

## 2024-06-25 DIAGNOSIS — R87.618 OTHER ABNORMAL CYTOLOGICAL FINDING OF SPECIMEN FROM CERVIX: Primary | ICD-10-CM

## 2024-06-25 PROCEDURE — 87624 HPV HI-RISK TYP POOLED RSLT: CPT | Performed by: OBSTETRICS & GYNECOLOGY

## 2024-06-25 PROCEDURE — 99214 OFFICE O/P EST MOD 30 MIN: CPT | Performed by: OBSTETRICS & GYNECOLOGY

## 2024-06-25 PROCEDURE — 88175 CYTOPATH C/V AUTO FLUID REDO: CPT | Performed by: OBSTETRICS & GYNECOLOGY

## 2024-06-25 NOTE — PROGRESS NOTES
Mayelin Flores is a 33 year old female  Patient's last menstrual period was 2024 (exact date).   Chief Complaint   Patient presents with    Follow - Up     Repeat pap   -Colpo was done 23  -Pt said YES to the student in the room   .Patient c/o cramping during menses, had 2 cycles last months, working a lot and feels stressed   Patient had HERI HPV neg , followed by normal colposcopy and EMB    OBSTETRICS HISTORY:  OB History    Para Term  AB Living   0 0 0 0 0 0   SAB IAB Ectopic Multiple Live Births   0 0 0 0 0       GYNE HISTORY:  Periods regular monthly    History   Sexual Activity    Sexual activity: Yes    Partners: Male     Comment: boyfriend                 MEDICAL HISTORY:  Past Medical History:    Abnormal uterine bleeding    Abnormal bleeding for about 5 days, no discomfort.    Allergic rhinitis    Ankle joint effusion, right    Anxiety    Dysmenorrhea    Every month.    Hyperlipidemia    Pap smear abnormality of cervix    Atypical Glandular Cells of Endocervical Origin    Pap smear for cervical cancer screening    Atypical Glandular Cells of Endocervical Origin Abnormal    Traumatic brain injury (HCC)    2021       SURGICAL HISTORY:  Past Surgical History:   Procedure Laterality Date    Colposcopy,bx cervix/endocerv curr  2023    A.  Endocervix, curettings: -Fragments of benign endocervix.   B.  Cervix, 11:00, biopsy: -Benign transformation zone with acute and chronic inflammation.   C.  Cervix, 6:00, biopsy: -Benign transformation zone with acute and chronic inflammation.  Charu Cunningham DO    Surgery - internal      fistulectomy       SOCIAL HISTORY:  Social History     Socioeconomic History    Marital status: Single     Spouse name: Not on file    Number of children: Not on file    Years of education: Not on file    Highest education level: Not on file   Occupational History    Not on file   Tobacco Use    Smoking status: Former     Current packs/day:  0.00     Types: Cigarettes     Quit date: 2018     Years since quittin.5    Smokeless tobacco: Never    Tobacco comments:     no expose at home   Vaping Use    Vaping status: Never Used   Substance and Sexual Activity    Alcohol use: Not Currently     Comment: rare    Drug use: Never    Sexual activity: Yes     Partners: Male     Comment: boyfriend   Other Topics Concern     Service Not Asked    Blood Transfusions Not Asked    Caffeine Concern Yes     Comment: Restlessness    Occupational Exposure Not Asked    Hobby Hazards Not Asked    Sleep Concern Not Asked    Stress Concern Yes     Comment: From work.    Weight Concern Yes     Comment: Weight gain.    Special Diet No    Back Care Not Asked    Exercise No    Bike Helmet Not Asked    Seat Belt No    Self-Exams Not Asked    Grew up on a farm Not Asked    History of tanning Not Asked    Outdoor occupation Not Asked    Breast feeding Not Asked    Reaction to local anesthetic No    Pt has a pacemaker No    Pt has a defibrillator No   Social History Narrative    Not on file     Social Determinants of Health     Financial Resource Strain: Not on file   Food Insecurity: Not on file   Transportation Needs: Not on file   Physical Activity: Not on file   Stress: Not on file   Social Connections: Not on file   Housing Stability: Not on file       FAMILY HISTORY:  Family History   Problem Relation Age of Onset    Hypertension Father         Hx: SVT, HLD, UTI, Kidney transplant, Kidney infection    Stroke Father     Lipids Father     Other (Other) Father         ESRD     Thyroid disease Mother     Lipids Mother         HLD    Cancer Maternal Grandfather         lung    Cancer Paternal Grandfather         Lung cancer with bone and brain mets.    No Known Problems Sister     Thyroid disease Brother         esrd ?dm       MEDICATIONS:    Current Outpatient Medications:     Tretinoin 0.05 % External Cream, , Disp: , Rfl:     ALPRAZolam 0.25 MG Oral Tab, Take 1  tablet (0.25 mg total) by mouth nightly as needed for Anxiety., Disp: 30 tablet, Rfl: 0    clobetasol 0.05 % External Cream, Apply to affected areas on legs twice daily  Monday-Friday until clear., Disp: 60 g, Rfl: 3    Multiple Vitamins-Minerals (IMMUNE SUPPORT) Oral Chew Tab, Chew 1 tablet by mouth., Disp: , Rfl:     Vitamin C 500 MG Oral Chew Tab, Chew 1 tablet (500 mg total) by mouth daily., Disp: , Rfl:     ALLERGIES:    Allergies   Allergen Reactions    Codeine RASH         PHYSICAL EXAM:   Pelvic Exam:  External Genitalia: normal appearance, hair distribution, and no lesions  Urethral Meatus:  normal in size, location, without lesions and prolapse  Bladder:  No fullness, masses or tenderness  Vagina:  Normal appearance without lesions, no abnormal discharge  Cervix:  Normal without tenderness on motion  Uterus: normal in size, contour, position, mobility, without tenderness  Adnexa: normal without masses or tenderness  Perineum: normal  Anus: no hemorroids     Discussed OCP, patient will monitor cycles for now, OTC medications for pain     Assessment & Plan:  1. Other abnormal cytological finding of specimen from cervix    - Hpv High Risk , Thin Prep Collect; Future  - ThinPrep PAP Smear B; Future

## 2024-06-26 LAB — HPV E6+E7 MRNA CVX QL NAA+PROBE: NEGATIVE

## 2024-07-02 LAB
.: ABNORMAL
.: ABNORMAL

## 2024-10-27 DIAGNOSIS — F41.9 ANXIETY: ICD-10-CM

## 2024-10-29 NOTE — TELEPHONE ENCOUNTER
Please review; protocol failed/ has no protocol      Recent fills: 03/21/2024  Last Rx written: 03/21/2024  Last Office Visit: 03/21/2024    Recent Visits  Date Type Provider Dept   03/21/24 Office Visit Erik Worrell MD [facility]-Internal Med       Requested Prescriptions   Pending Prescriptions Disp Refills    ALPRAZOLAM 0.25 MG Oral Tab [Pharmacy Med Name: Alprazolam 0.25 Mg Tab Acta] 30 tablet 0     Sig: Take 1 tablet (0.25 mg total) by mouth nightly as needed for Anxiety.       Controlled Substance Medication Failed - 10/29/2024  1:46 PM        Failed - This medication is a controlled substance - forward to provider to refill           Recent Outpatient Visits              4 months ago Other abnormal cytological finding of specimen from cervix    [facility] - OB/GYN Charu Cunningham DO    Office Visit    7 months ago Annual physical exam    [facility] Erik Worrell MD    Office Visit    11 months ago Atypical glandular cells of undetermined significance (HERI) on cervical Pap smear    [facility] - OB/Charu Mathis DO    Office Visit    1 year ago Encounter for well woman exam with routine gynecological exam    [facility] - OB/Charu Mathis DO    Office Visit    1 year ago Eczema, unspecified type    [facility] Tr Julian MD    Office Visit

## 2024-10-31 RX ORDER — ALPRAZOLAM 0.25 MG/1
0.25 TABLET ORAL NIGHTLY PRN
Qty: 30 TABLET | Refills: 0 | Status: SHIPPED | OUTPATIENT
Start: 2024-10-31

## 2025-03-24 RX ORDER — SERTRALINE HYDROCHLORIDE 25 MG/1
25 TABLET, FILM COATED ORAL DAILY
Qty: 30 TABLET | Refills: 1 | OUTPATIENT
Start: 2025-03-24

## 2025-03-24 NOTE — TELEPHONE ENCOUNTER
Outpatient Medication Detail     Disp Refills Start End    sertraline 25 MG Oral Tab 30 tablet 1 2/20/2025 --    Sig - Route: Take 1 tablet (25 mg total) by mouth daily. - Oral    Sent to pharmacy as: Sertraline HCl 25 MG Oral Tablet (Zoloft)    E-Prescribing Status: Receipt confirmed by pharmacy (2/20/2025  2:24 PM CST)      Pharmacy    [id number] - [address] [facility] [phone], [phone]

## 2025-04-22 NOTE — TELEPHONE ENCOUNTER
Please review,   This is new prescription - do you want patient to have follow-up appointment?     Requested Prescriptions   Pending Prescriptions Disp Refills    SERTRALINE 25 MG Oral Tab [Pharmacy Med Name: Sertraline Hydrochloride 25 Mg Tab [place]] 30 tablet 0     Sig: Take 1 tablet (25 mg total) by mouth daily.       Psychiatric Non-Scheduled (Anti-Anxiety) Passed - 4/22/2025  6:02 PM        Passed - In person appointment or virtual visit in the past 6 mos or appointment in next 3 mos        Passed - Depression Screening completed within the past 12 months        Passed - Medication is active on med list

## 2025-04-23 RX ORDER — SERTRALINE HYDROCHLORIDE 25 MG/1
25 TABLET, FILM COATED ORAL DAILY
Qty: 30 TABLET | Refills: 1 | Status: SHIPPED | OUTPATIENT
Start: 2025-04-23

## 2025-04-23 NOTE — TELEPHONE ENCOUNTER
Dr Worrell ==see also Abeba's note below.       VISIT note 2/20/25;  She is not pregnant. We agreed to start sertraline 25mg daily; she will contact us in 3 to 4 weeks for update. Pt to call back if any persist/worsens.     No future appointments.

## 2025-05-02 DIAGNOSIS — F41.9 ANXIETY: ICD-10-CM

## 2025-05-05 NOTE — TELEPHONE ENCOUNTER
Please review; protocol failed/No Protocol      Recent Fills: No dispense history in the last 6 months    Last Rx Written: 10/31/2024    Last Office Visit: 02/20/2025

## 2025-05-06 RX ORDER — ALPRAZOLAM 0.25 MG
0.25 TABLET ORAL NIGHTLY PRN
Qty: 30 TABLET | Refills: 0 | Status: SHIPPED | OUTPATIENT
Start: 2025-05-06

## 2025-06-28 RX ORDER — SERTRALINE HYDROCHLORIDE 25 MG/1
25 TABLET, FILM COATED ORAL DAILY
Qty: 90 TABLET | Refills: 0 | Status: SHIPPED | OUTPATIENT
Start: 2025-06-28

## 2025-06-28 NOTE — TELEPHONE ENCOUNTER
Refill passed per [facility] protocol.    No future appointments.      Last refill was 4/23/2025  Last office visit 2/20/2025  Assessment & Plan:  (F41.9) Anxiety  (primary encounter diagnosis)  Plan: pt had tried to get transferred to another Dept in her work to avoid coworker mistreating her but no available positions; she also went to her supervisor to complain but nothing was really done. She states she needs to continue to work in same place. We then talked about medication to help her anxiety. I told her SSRI would be first line instead of benzodiazepine.  Pt agreeable. She is not pregnant. We agreed to start sertraline 25mg daily; she will contact us in 3 to 4 weeks for update. Pt to call back if any persist/worsens.       Requested Prescriptions   Pending Prescriptions Disp Refills    SERTRALINE 25 MG Oral Tab [Pharmacy Med Name: Sertraline Hydrochloride 25 Mg Tab Nort] 30 tablet 0     Sig: Take 1 tablet (25 mg total) by mouth daily.       Psychiatric Non-Scheduled (Anti-Anxiety) Passed - 6/28/2025  9:15 AM        Passed - In person appointment or virtual visit in the past 6 mos or appointment in next 3 mos     Recent Outpatient Visits              4 months ago Anxiety    [facility] Erik Worrell MD    Office Visit    1 year ago Other abnormal cytological finding of specimen from cervix    [facility] - OB/GYN Charu Cunningham DO    Office Visit    1 year ago Annual physical exam    [facility] Erik Zarate MD    Office Visit    1 year ago Atypical glandular cells of undetermined significance (HERI) on cervical Pap smear    [facility] - OB/GYN Charu Cunningham DO    Office Visit    1 year ago Encounter for well woman exam with routine gynecological exam    [facility]  [facility] - OB/Charu Mathis,     Office Visit                      Passed - Depression Screening completed within the past 12 months        Passed - Medication is active on med list             Recent Outpatient Visits              4 months ago [facility]xiety    [facility]    Office Visit    1 year ago Other abnormal cytological finding of specimen from cervix    [facility] [place]    Office Visit    1 year ago Annual physical exam    [facility] [place] [place]    Office Visit    1 year ago Atypical glandular cells of undetermined significance (HERI) on cervical Pap smear    [facility] [place] [facility]    Office Visit    1 year ago Encounter for well woman exam with routine gynecological exam    [facility]    Office Visit

## (undated) NOTE — LETTER
[facility] [place] Department of OB/GYN  After Care Instructions for Colposcopy/Biopsy      Biopsy Results   You will receive a phone call with your biopsy results in 7 business days. If you have not received your biopsy results in 7 days, please contact our office. Your biopsy results will help the physician decide if and what further treatment is  necessary. Bleeding   After your biopsy, the area is swabbed with a brown solution to help stop any bleeding. For this reason, you may notice a brown or black clotty discharge lasting several days. If you experience bright red bleeding that is heavy, you should call the office. Restrictions    You should avoid douching, intercourse and tampon use for 3 days following your procedure. Pain    If you are uncomfortable after the procedure, you may use Aleve, Tylenol or Ibuprofen for the discomfort. If you have additional questions or concerns, please call us at [phone].

## (undated) NOTE — LETTER
1/18/2022    To Whom It May Concern:    Colletta Chamberlain is currently under my medical care and may not return to work at this time. If you require additional information please contact our office.     Sincerely,    CHRIS Calderonu

## (undated) NOTE — LETTER
2/6/2021             RE: Sulaiman Jeter        [id number] [facility] [address]  To Whom It May Concern,    Bob Flores is currently under my care for her medical condition.   Related to her current medical condition, she is un

## (undated) NOTE — LETTER
2/5/2021      To Whom It May Concern:    Loretta Shah is currently under my medical care and may not return to school at this time. Please excuse lOya Spring until her current medical  condition improves.    If you have any questions or concerns,

## (undated) NOTE — LETTER
[facility] [place] Department of [place]N  After Care Instructions for Endometrial Biopsy      Biopsy Results   You will receive a phone call with your biopsy results in 7 business days. If you have not received your results in 7 days, please contact our office. The results of your biopsy will determine if further treatment will be necessary. Bleeding   You may have some light bleeding or blackish clumpy discharge for several days after your biopsy. Restrictions    You should avoid intercourse or tampon use for 1 day after your biopsy. Pain    You may experience mild menstrual cramping after your biopsy. You may use Ibuprofen, Aleve or Tylenol to relieve your discomfort. If you experience severe or persistent pain contact our office. If you have any additional questions, please call us at [phone].

## (undated) NOTE — LETTER
12/30/20        Wanda Hwang [address]      Dear John Lund,    [facility] records indicate that you have outstanding lab work and or testing that was ordered for you and has not yet been completed:  Orders Placed This Encounter